# Patient Record
Sex: FEMALE | Race: WHITE | NOT HISPANIC OR LATINO | Employment: OTHER | URBAN - METROPOLITAN AREA
[De-identification: names, ages, dates, MRNs, and addresses within clinical notes are randomized per-mention and may not be internally consistent; named-entity substitution may affect disease eponyms.]

---

## 2017-01-11 ASSESSMENT — MIFFLIN-ST. JEOR: SCORE: 1390.56

## 2017-01-16 ENCOUNTER — SURGERY - HEALTHEAST (OUTPATIENT)
Dept: SURGERY | Facility: CLINIC | Age: 77
End: 2017-01-16

## 2017-01-16 ENCOUNTER — ANESTHESIA - HEALTHEAST (OUTPATIENT)
Dept: SURGERY | Facility: CLINIC | Age: 77
End: 2017-01-16

## 2017-01-17 ENCOUNTER — HOME CARE/HOSPICE - HEALTHEAST (OUTPATIENT)
Dept: HOME HEALTH SERVICES | Facility: HOME HEALTH | Age: 77
End: 2017-01-17

## 2017-01-24 ENCOUNTER — OFFICE VISIT - HEALTHEAST (OUTPATIENT)
Dept: GERIATRICS | Facility: CLINIC | Age: 77
End: 2017-01-24

## 2017-01-24 DIAGNOSIS — M67.912 DYSFUNCTION OF LEFT ROTATOR CUFF: ICD-10-CM

## 2017-01-24 DIAGNOSIS — E11.69 DIABETES MELLITUS TYPE 2 IN OBESE: ICD-10-CM

## 2017-01-24 DIAGNOSIS — L71.9 ACNE ROSACEA: ICD-10-CM

## 2017-01-24 DIAGNOSIS — M54.16 LUMBAR RADICULOPATHY: ICD-10-CM

## 2017-01-24 DIAGNOSIS — Z96.651 STATUS POST RIGHT KNEE REPLACEMENT: ICD-10-CM

## 2017-01-24 DIAGNOSIS — E66.9 DIABETES MELLITUS TYPE 2 IN OBESE: ICD-10-CM

## 2017-01-26 ENCOUNTER — OFFICE VISIT - HEALTHEAST (OUTPATIENT)
Dept: GERIATRICS | Facility: CLINIC | Age: 77
End: 2017-01-26

## 2017-01-26 DIAGNOSIS — E11.69 DIABETES MELLITUS TYPE 2 IN OBESE: ICD-10-CM

## 2017-01-26 DIAGNOSIS — E03.9 HYPOTHYROIDISM: ICD-10-CM

## 2017-01-26 DIAGNOSIS — M54.16 LUMBAR RADICULOPATHY: ICD-10-CM

## 2017-01-26 DIAGNOSIS — E66.9 DIABETES MELLITUS TYPE 2 IN OBESE: ICD-10-CM

## 2017-01-26 DIAGNOSIS — Z96.651 STATUS POST RIGHT KNEE REPLACEMENT: ICD-10-CM

## 2017-01-27 ENCOUNTER — AMBULATORY - HEALTHEAST (OUTPATIENT)
Dept: GERIATRICS | Facility: CLINIC | Age: 77
End: 2017-01-27

## 2021-05-30 VITALS — WEIGHT: 202 LBS | BODY MASS INDEX: 33.72 KG/M2

## 2021-05-30 VITALS — HEIGHT: 65 IN | BODY MASS INDEX: 33.66 KG/M2 | WEIGHT: 202 LBS

## 2021-06-08 NOTE — ANESTHESIA CARE TRANSFER NOTE
Last vitals:   Vitals:    01/16/17 1231   BP: 157/81   Pulse: 80   Resp: 10   Temp: 35.9  C (96.6  F)   SpO2: 100%     Patient's level of consciousness is drowsy, not awake, difficult ot arouse to name called.  Spontaneous respirations: yes  Maintains airway independently: NO. ETT T-piece 6L O2  Dentition unchanged: yes  Oropharynx: oropharynx clear of all foreign objects except ETT    QCDR Measures:  ASA# 20 - Surgical Safety Checklist: ASA20A - Safety Checks Done  PQRS# 430 - Adult PONV Prevention: 4558F - Pt received => 2 anti-emetic agents (different classes) preop & intraop  ASA# 8 - Peds PONV Prevention: NA - Not pediatric patient, not GA or 2 or more risk factors NOT present  PQRS# 424 - Jessie-op Temp Management: 4559F - At least one body temp DOCUMENTED => 35.5C or 95.9F within required timeframe  PQRS# 426 - PACU Transfer Protocol: - Transfer of care checklist used  ASA# 14 - Acute Post-op Pain: ASA14B - Patient did NOT experience pain >= 7 out of 10    I completed my SBAR handoff to the receiving nurse per policy and procedure.

## 2021-06-08 NOTE — PROGRESS NOTES
[facility]      Code Status:  FULL CODE  Visit Type: Review Of Multiple Medical Conditions     Facility:  [facility] [[id number]]           History of Present Illness: Saima Posadas is a 77 y.o. female who is admitted to TCU  Patient is a very pleasant 77-year-old was electively admitted to the hospital for a right TKA done due to debilitating arthritis  she has underlying history bone on bone arthritis and elected to have this procedure done. Postoperatively she's doing quite well and pain is under control and her current regimen  her past medical history significant for history of chronic low back pain which limits our ability to transfer but she says it is under control along with COPD and diabetes along with breast cancer currently in remission  she has a history of nasal epistaxis in the past  She has been put on aspirin for DVT prophylaxis in an ultrasound of her right lower extremity was negative for any DVT prior to her discharge  Now has swelling around leg and is concerned about that-plans on going home today and doing well in PT  Doing 2stairs; SLUMS 28/30; Tinetti 14/28 and walking well and hoping to go home soon    Past Medical History   Diagnosis Date     Allergic rhinitis      Cancer      breast     COPD (chronic obstructive pulmonary disease)      Diabetes mellitus      Disease of thyroid gland      DJD (degenerative joint disease)      Esophageal reflux      Frequent falls      Low back pain      Rosacea      Past Surgical History   Procedure Laterality Date     Septoplasty       Uvulectomy       Dilation and curettage of uterus       Mastectomy Left      Breast cyst incision and drainage       Tubal ligation       Carpal tunnel release       Pr total knee arthroplasty Right 1/16/2017     Procedure:  RIGHT TOTAL KNEE  ARTHROPLASTY;  Surgeon: Daniel Mckinney MD;  Location: [facility] OR;  Service: Orthopedics     No family history on file.  Social History      Social History     Marital status:      Spouse name: N/A     Number of children: N/A     Years of education: N/A     Occupational History     Not on file.     Social History Main Topics     Smoking status: Former Smoker     Quit date: 2005     Smokeless tobacco: Not on file     Alcohol use 0.6 oz/week     1 Glasses of wine per week      Comment: daily     Drug use: No     Sexual activity: Not on file     Other Topics Concern     Not on file     Social History Narrative     Current Outpatient Prescriptions   Medication Sig Dispense Refill     acetaminophen (TYLENOL) 500 MG tablet Take 2 tablets (1,000 mg total) by mouth 3 (three) times a day as needed for pain. 30 tablet 0     aspirin 325 MG tablet Take 1 tablet (325 mg total) by mouth 2 (two) times a day with meals. 90 tablet 0     cholecalciferol, vitamin D3, (VITAMIN D3) 2,000 unit cap Take 1 capsule by mouth daily.       diphenhydrAMINE-acetaminophen (TYLENOL PM EXTRA STRENGTH)  mg Tab Take 1 tablet by mouth bedtime as needed.       docoshexanoic acid-eicosapent 500 mg (FISH OIL) 500-100 mg cap capsule Take 1,000 mg by mouth daily.       docusate sodium (COLACE) 100 MG capsule Take 1 capsule (100 mg total) by mouth 2 (two) times a day as needed for constipation. 20 capsule 0     doxycycline (VIBRAMYCIN) 100 MG capsule Take 100 mg by mouth daily.        fexofenadine (ALLEGRA) 180 MG tablet Take 180 mg by mouth daily.       fluticasone (FLONASE) 50 mcg/actuation nasal spray 2 sprays into each nostril 2 (two) times a day.       levothyroxine (SYNTHROID, LEVOTHROID) 125 MCG tablet Take 125 mcg by mouth daily.       metFORMIN (GLUCOPHAGE-XR) 500 MG 24 hr tablet Take 1,000 mg by mouth 2 (two) times a day with meals.       oxybutynin (DITROPAN XL) 10 MG ER tablet Take 10 mg by mouth daily as needed.        oxyCODONE (ROXICODONE) 5 MG immediate release tablet Take 1 tablet (5 mg total) by mouth every 4 (four) hours. 60 tablet 0     No current  facility-administered medications for this visit.          Review of Systems:    Constitutional: Negative.  Negative for fever, chills, has activity change, appetite change and fatigue.   HENT: Negative for congestion and facial swelling.    Eyes: Negative for photophobia, redness and visual disturbance.   Respiratory: Negative for cough and chest tightness.    Cardiovascular: Negative for chest pain, palpitations and leg swelling.   Gastrointestinal: Negative for nausea, diarrhea, constipation, blood in stool and abdominal distention.   Genitourinary: Negative.    Musculoskeletal: Negative. Pain rt knee   Skin: Negative.    Neurological: Negative for dizziness, tremors, syncope, weakness, light-headedness and headaches.   Hematological: Does not bruise/bleed easily.   Psychiatric/Behavioral: Negative.      Vitals:    01/26/17 1458   BP: 138/72   Pulse: 78   Resp: 18   Temp: 98  F (36.7  C)       Physical Exam:    GENERAL: no acute distress. Cooperative in conversation.   HEENT: pupils are equal, round and reactive. Oral mucosa is moist and intact.  RESP:Chest symmetric. Regular respiratory rate. No stridor.  CVS: S1S2  ABD: Nondistended, soft.  EXTREMITIES: 1+ rt lower extremity edema. Her incision is intact and that is significant effusion around the right knee with no erythema  NEURO: non focal. Alert and oriented x3.   PSYCH: within normal limits. No depression or anxiety.  SKIN: warm dry intact     Labs:    Xr Knee Right 1 Or 2 Vws Portable  Result Date: 1/16/2017  XR KNEE RIGHT 1 OR 2 VWS PORTABLE 1/16/2017 1:01 PM INDICATION: eval prosthesis COMPARISON: None. FINDINGS: Postoperative changes of arthroplasty. Components are well seated and normally aligned. No evidence of a complication.    Us Venous Leg Right-Result Date: 1/19/2017  CONCLUSION: 1.  Right leg veins are negative for DVT.    Assessment/Plan:    Osteoarthritis of the right knee, S/P TKA, -  She's actually doing quite well; her pain is controlled  and she's ambulating well with a walker  she has a follow-up schedule with orthopedics  no new concerns her CPM was refuses she's already up to 88  with flexion  Advised option of trying low dose diretic vs rpt U/S of leg and pt wants to wait and watch and will call Ortho is swelling is any worse  Diabetes mellitus type 2-Stable with no Accu checks ordered in the TCU  - Metformin resumed ON DC TO TCU; doing well  Acne rosacea  - Continue doxycycline  Rotator cuff injury left shoulder, limited range of motion-no new concerns  COPD-cont with her meds  History of breast cancer December 2005-  Lumbar radiculopathy, bilateral  - No further imaging indicated at this time  - Continue conservative therapy  Acute blood loss anemia-no BT given in the hospital and pt is asymtomatic  Swelling right lower extremity  - Ultrasound negative for DVT; on asa BId For DVT prophylaxis  ABLA- dc hg 11.3 and no BT needed  Debilitation she's actually good doing quite well ambulating using a four-wheel walker managing to stairs tinetti is 14 and slums is 28/30  History of epistaxis- at her request and refusal will discontinue her vitamin D fish oil and nasal spray  she can's discuss this with a primary care physician when it is safe to resume  Monitor leg swelling and advised close ortho f/u and elevation and icing-does not want to try diuretic with he rh/o of urge incontinence  And limited mobility..   Total time spent was 35 minutes, more than half of it was in face-to-face counseling regarding disease state, treatment, side effects, documentation, review of clinical data and coordination of care.she is doing well and hoping to go home this week.    Electronically signed by: FABRICIO Galeana  This progress note was completed using Dragon software and there may be grammatical errors.

## 2021-06-08 NOTE — ANESTHESIA PROCEDURE NOTES
Peripheral Block    Patient location during procedure: pre-op  Start time: 1/16/2017 9:48 AM  End time: 1/16/2017 9:52 AM  post-op analgesia per surgeon order as noted in medical record  Staffing:  Performing  Anesthesiologist: ROLAND MORAN  Preanesthetic Checklist  Completed: patient identified, site marked, risks, benefits, and alternatives discussed, timeout performed, consent obtained, airway assessed, oxygen available, suction available, emergency drugs available and hand hygiene performed  Peripheral Block  Block type: other (Selective tibial nerve block), tibial  Prep: ChloraPrep  Patient position: supine  Patient monitoring: cardiac monitor, continuous pulse oximetry, heart rate and blood pressure  Laterality: right  Injection technique: ultrasound guided    Ultrasound used to visualize needle placement in proximity to nerve being blocked: yes   Permanent ultrasound image captured for medical record    Needle  Needle type: Stimuplex   Needle gauge: 20G  Needle length: 6 in  no peripheral nerve catheter placed  Assessment  Injection assessment: negative aspiration for heme, no paresthesia on injection, incremental injection and no difficulty with injection  Additional Notes  10ml 0.25% bupivacaine w/ epi 1:200K injected for nerve block.

## 2021-06-08 NOTE — ANESTHESIA POSTPROCEDURE EVALUATION
Patient: Saima Posadas   RIGHT TOTAL KNEE  ARTHROPLASTY  Anesthesia type: general    Patient location: PACU  Last vitals:   Vitals:    01/16/17 1310   BP: 135/62   Pulse: 79   Resp: 11   Temp:    SpO2: 100%     Post vital signs: stable  Level of consciousness: awake and responds to simple questions  Post-anesthesia pain: pain controlled  Post-anesthesia nausea and vomiting: no  Pulmonary: unassisted, return to baseline  Cardiovascular: stable and blood pressure at baseline  Hydration: adequate  Anesthetic events: no    QCDR Measures:  ASA# 11 - Jessie-op Cardiac Arrest: ASA11B - Patient did NOT experience unanticipated cardiac arrest  ASA# 12 - Jessie-op Mortality Rate: ASA12B - Patient did NOT die  ASA# 13 - PACU Re-Intubation Rate: ASA13B - Patient did NOT require a new airway mgmt  ASA# 10 - Composite Anes Safety: ASA10A - No serious adverse event  ASA# 38 - New Corneal Injury: ASA38A - No new exposure keratitis or corneal abrasion in PACU    Additional Notes:

## 2021-06-08 NOTE — ANESTHESIA PROCEDURE NOTES
Spinal Block    Patient location during procedure: OR  Start time: 1/16/2017 10:10 AM  End time: 1/16/2017 10:13 AM  Reason for block: primary anesthetic    Staffing:  Performing  Anesthesiologist: ROLAND MORAN    Preanesthetic Checklist  Completed: patient identified, risks, benefits, and alternatives discussed, timeout performed, consent obtained, airway assessed, oxygen available, suction available, emergency drugs available and hand hygiene performed  Spinal Block  Patient position: sitting  Prep: ChloraPrep  Patient monitoring: heart rate, cardiac monitor, continuous pulse ox and blood pressure  Approach: midline  Location: L3-4  Injection technique: single-shot  Needle type: pencil-tip   Needle gauge: 24 G

## 2021-06-08 NOTE — ANESTHESIA PREPROCEDURE EVALUATION
Anesthesia Evaluation      Patient summary reviewed   No history of anesthetic complications     Airway   Mallampati: II  Neck ROM: full   Pulmonary - negative ROS and normal exam   (+) COPD moderate,   (-) sleep apnea                         Cardiovascular - negative ROS and normal exam   Neuro/Psych - negative ROS     Endo/Other - negative ROS   (+) diabetes mellitus well controlled, hypothyroidism, obesity,      GI/Hepatic/Renal - negative ROS   (+) GERD well controlled,             Dental - normal exam                        Anesthesia Plan  Planned anesthetic: peripheral nerve block and general endotracheal  ACB + STNB for POA as ordered by surgeon    ASA 3     Anesthetic plan and risks discussed with: patient    Post-op plan: routine recovery

## 2021-06-08 NOTE — PROGRESS NOTES
[facility]      Code Status:  FULL CODE  Visit Type: H & P     Facility:  [facility] [[id number]]           History of Present Illness: Saima Posadas is a 77 y.o. female who is admitted to TCU  Patient is a very pleasant 77-year-old was electively admitted to the hospital for a right TKA done due to debilitating arthritis  she has underlying history bone on bone arthritis and elected to have this procedure done. Postoperatively she's doing quite well and pain is under control and her current regimen  her past medical history significant for history of chronic low back pain which limits our ability to transfer but she says it is under control along with COPD and diabetes along with breast cancer currently in remission  she has a history of nasal epistaxis in the past  She has been put on aspirin for DVT prophylaxis in an ultrasound of her right lower extremity was negative for any DVT prior to her discharge  Pt has been refusing a lot of her medications -Specifically she has been refusing her fish oil; vitamin D as well as nasal spray as she was told that it would lower her risk for epistaxis  Doing 2stairs; SLUMS 28/30; Tinetti 14/28 and walking well and hoping to go home soon    Past Medical History   Diagnosis Date     Allergic rhinitis      Cancer      breast     COPD (chronic obstructive pulmonary disease)      Diabetes mellitus      Disease of thyroid gland      DJD (degenerative joint disease)      Esophageal reflux      Frequent falls      Low back pain      Rosacea      Past Surgical History   Procedure Laterality Date     Septoplasty       Uvulectomy       Dilation and curettage of uterus       Mastectomy Left      Breast cyst incision and drainage       Tubal ligation       Carpal tunnel release       Pr total knee arthroplasty Right 1/16/2017     Procedure:  RIGHT TOTAL KNEE  ARTHROPLASTY;  Surgeon: Daniel Mckinney MD;  Location: [facility];  Service:  Orthopedics     No family history on file.  Social History     Social History     Marital status:      Spouse name: N/A     Number of children: N/A     Years of education: N/A     Occupational History     Not on file.     Social History Main Topics     Smoking status: Former Smoker     Quit date: 2005     Smokeless tobacco: Not on file     Alcohol use 0.6 oz/week     1 Glasses of wine per week      Comment: daily     Drug use: No     Sexual activity: Not on file     Other Topics Concern     Not on file     Social History Narrative     Current Outpatient Prescriptions   Medication Sig Dispense Refill     acetaminophen (TYLENOL) 500 MG tablet Take 2 tablets (1,000 mg total) by mouth 3 (three) times a day as needed for pain. 30 tablet 0     aspirin 325 MG tablet Take 1 tablet (325 mg total) by mouth 2 (two) times a day with meals. 90 tablet 0     cholecalciferol, vitamin D3, (VITAMIN D3) 2,000 unit cap Take 1 capsule by mouth daily.       diphenhydrAMINE-acetaminophen (TYLENOL PM EXTRA STRENGTH)  mg Tab Take 1 tablet by mouth bedtime as needed.       docoshexanoic acid-eicosapent 500 mg (FISH OIL) 500-100 mg cap capsule Take 1,000 mg by mouth daily.       docusate sodium (COLACE) 100 MG capsule Take 1 capsule (100 mg total) by mouth 2 (two) times a day as needed for constipation. 20 capsule 0     doxycycline (VIBRAMYCIN) 100 MG capsule Take 100 mg by mouth daily.        fexofenadine (ALLEGRA) 180 MG tablet Take 180 mg by mouth daily.       fluticasone (FLONASE) 50 mcg/actuation nasal spray 2 sprays into each nostril 2 (two) times a day.       levothyroxine (SYNTHROID, LEVOTHROID) 125 MCG tablet Take 125 mcg by mouth daily.       metFORMIN (GLUCOPHAGE-XR) 500 MG 24 hr tablet Take 1,000 mg by mouth 2 (two) times a day with meals.       oxybutynin (DITROPAN XL) 10 MG ER tablet Take 10 mg by mouth daily as needed.        oxyCODONE (ROXICODONE) 5 MG immediate release tablet Take 1 tablet (5 mg total) by mouth  every 4 (four) hours. 60 tablet 0     No current facility-administered medications for this visit.      Allergies   Allergen Reactions     Aspirin      Nose bleeds         Review of Systems:    Constitutional: Negative.  Negative for fever, chills, has activity change, appetite change and fatigue.   HENT: Negative for congestion and facial swelling.    Eyes: Negative for photophobia, redness and visual disturbance.   Respiratory: Negative for cough and chest tightness.    Cardiovascular: Negative for chest pain, palpitations and leg swelling.   Gastrointestinal: Negative for nausea, diarrhea, constipation, blood in stool and abdominal distention.   Genitourinary: Negative.    Musculoskeletal: Negative. Pain rt knee   Skin: Negative.    Neurological: Negative for dizziness, tremors, syncope, weakness, light-headedness and headaches.   Hematological: Does not bruise/bleed easily.   Psychiatric/Behavioral: Negative.      Vitals:    01/24/17 1027   BP: 130/63   Pulse: 76   Temp: 97  F (36.1  C)       Physical Exam:    GENERAL: no acute distress. Cooperative in conversation.   HEENT: pupils are equal, round and reactive. Oral mucosa is moist and intact.  RESP:Chest symmetric. Regular respiratory rate. No stridor.  CVS: S1S2  ABD: Nondistended, soft.  EXTREMITIES: 1+ rt lower extremity edema. Her incision is intact and that is significant effusion around the right knee with no erythema  NEURO: non focal. Alert and oriented x3.   PSYCH: within normal limits. No depression or anxiety.  SKIN: warm dry intact     Labs:    Recent Results (from the past 240 hour(s))   POCT Glucose   Result Value Ref Range    Glucose,  mg/dL   INR (if patient on warfarin)   Result Value Ref Range    INR 0.95 0.90 - 1.10   POCT Glucose   Result Value Ref Range    Glucose,  mg/dL   POCT Glucose   Result Value Ref Range    Glucose,  mg/dL   POCT Glucose   Result Value Ref Range    Glucose,  mg/dL   Hemoglobin - Daily x 2    Result Value Ref Range    Hemoglobin 12.2 12.0 - 16.0 g/dL   Basic Metabolic Panel   Result Value Ref Range    Sodium 140 136 - 145 mmol/L    Potassium 4.1 3.5 - 5.0 mmol/L    Chloride 103 98 - 107 mmol/L    CO2 30 22 - 31 mmol/L    Anion Gap, Calculation 7 5 - 18 mmol/L    Glucose 105 70 - 125 mg/dL    Calcium 8.9 8.5 - 10.5 mg/dL    BUN 8 8 - 28 mg/dL    Creatinine 0.56 (L) 0.60 - 1.10 mg/dL    GFR MDRD Af Amer >60 >60 mL/min/1.73m2    GFR MDRD Non Af Amer >60 >60 mL/min/1.73m2   POCT Glucose   Result Value Ref Range    Glucose,  mg/dL   POCT Glucose   Result Value Ref Range    Glucose,  mg/dL   POCT Glucose   Result Value Ref Range    Glucose,  mg/dL   POCT Glucose   Result Value Ref Range    Glucose,  mg/dL   Hemoglobin - Daily x 2   Result Value Ref Range    Hemoglobin 11.3 (L) 12.0 - 16.0 g/dL   POCT Glucose   Result Value Ref Range    Glucose,  mg/dL   POCT Glucose   Result Value Ref Range    Glucose,  mg/dL   POCT Glucose   Result Value Ref Range    Glucose,  mg/dL   POCT Glucose   Result Value Ref Range    Glucose,  mg/dL   POCT Glucose   Result Value Ref Range    Glucose,  mg/dL   POCT Glucose   Result Value Ref Range    Glucose, POC 87 mg/dL     Xr Knee Right 1 Or 2 Vws Portable    Result Date: 1/16/2017  XR KNEE RIGHT 1 OR 2 VWS PORTABLE 1/16/2017 1:01 PM INDICATION: eval prosthesis COMPARISON: None. FINDINGS: Postoperative changes of arthroplasty. Components are well seated and normally aligned. No evidence of a complication.    Us Venous Leg Right    Result Date: 1/19/2017  US LWR EXT VENOUS DUPLEX RT 1/19/2017 9:04 AM INDICATION: Calf swelling and pain, postoperative. TECHNIQUE: Routine exam without and with compression, augmentation, and duplex utilizing 2D gray-scale imaging, Doppler interrogation with color-flow and spectral waveform analysis. COMPARISON: None. FINDINGS: The common femoral, femoral, popliteal, and segmentally  visualized calf veins were evaluated. The opposite CFV was also included in the evaluation. Right leg veins are negative for deep venous thrombosis. No popliteal cysts.     CONCLUSION: 1.  Right leg veins are negative for DVT.    Assessment/Plan:    Osteoarthritis of the right knee, S/P TKA, -  She's actually doing quite well; her pain is controlled and she's ambulating well with a walker  she has a follow-up schedule with orthopedics  no new concerns her CPM was refuses she's already up to 88  with flexion  Diabetes mellitus type 2-Stable with no Accu checks ordered in the TCU  - Metformin resumed ON DC TO TCU; doing well  Acne rosacea  - Continue doxycycline  Rotator cuff injury left shoulder, limited range of motion-no new concerns  COPD-cont with her meds  History of breast cancer December 2005-  Lumbar radiculopathy, bilateral  - No further imaging indicated at this time  - Continue conservative therapy  Acute blood loss anemia-no BT given in the hospital and pt is asymtomatic  Swelling right lower extremity  - Ultrasound negative for DVT; on asa BId For DVT prophylaxis  ABLA- dc hg 11.3 and no BT needed  Debilitation she's actually good doing quite well ambulating using a four-wheel walker managing to stairs tinetti is 14 and slums is 28/30  History of epistaxis- at her request and refusal will discontinue her vitamin D fish oil and nasal spray  she can's discuss this with a primary care physician when it is safe to resume  Total time spent was 45 minutes, more than half of it was in face-to-face counseling regarding disease state, treatment, side effects, documentation, review of clinical data and coordination of care.she is doing well and hoping to go home this week.    Electronically signed by: FABRICIO Galeana  This progress note was completed using Dragon software and there may be grammatical errors.

## 2021-06-08 NOTE — ANESTHESIA PROCEDURE NOTES
Peripheral Block    Patient location during procedure: pre-op  Start time: 1/16/2017 9:45 AM  End time: 1/16/2017 9:49 AM  post-op analgesia per surgeon order as noted in medical record  Staffing:  Performing  Anesthesiologist: ROLAND MORAN  Preanesthetic Checklist  Completed: patient identified, site marked, risks, benefits, and alternatives discussed, timeout performed, consent obtained, airway assessed, oxygen available, suction available, emergency drugs available and hand hygiene performed  Peripheral Block  Block type: saphenous, adductor canal block  Prep: ChloraPrep  Patient position: supine  Patient monitoring: blood pressure, heart rate, continuous pulse oximetry and cardiac monitor  Laterality: right  Injection technique: ultrasound guided    Ultrasound used to visualize needle placement in proximity to nerve being blocked: yes   Permanent ultrasound image captured for medical record    Needle  Needle type: Stimuplex   Needle gauge: 20G  Needle length: 6 in  no peripheral nerve catheter placed  Assessment  Injection assessment: negative aspiration for heme, no paresthesia on injection, incremental injection and no difficulty with injection  Additional Notes  15ml 0.5% bupivacaine w/ epi 1:200K injected for nerve block.

## 2021-06-15 PROBLEM — Z96.651 STATUS POST RIGHT KNEE REPLACEMENT: Status: ACTIVE | Noted: 2017-01-16

## 2022-11-08 ENCOUNTER — TRANSFERRED RECORDS (OUTPATIENT)
Dept: HEALTH INFORMATION MANAGEMENT | Facility: CLINIC | Age: 82
End: 2022-11-08

## 2023-05-03 ENCOUNTER — HOSPITAL ENCOUNTER (INPATIENT)
Facility: CLINIC | Age: 83
Setting detail: SURGERY ADMIT
End: 2023-05-03
Attending: ORTHOPAEDIC SURGERY | Admitting: ORTHOPAEDIC SURGERY
Payer: COMMERCIAL

## 2023-05-22 NOTE — H&P (VIEW-ONLY)
[facility]      Preoperative Consultation   Saima Posadas   : 1940   Gender: female    Date of Encounter: 2023    Nursing Notes:   Elizabeth Mon  2023 11:00 AM  Signed  Saima Posadas is a 83 y.o. female (1940) who presents for preop evaluation undergoing back surgery.    Date of Surgery: 23  Surgical Specialty: Orthopedic/Spine  Poncho Hui MD - [facility]  Hospital/Surgical Facility: [facility]  Fax number: [phone] and [phone]  Surgery type: inpatient  Primary Physician: Saida Dow LPN ....................  2023  10:32 AM     Elizabeth Mon  2023 11:00 AM  Signed  Chief Complaint   Patient presents with     Pre-Op Exam       Additional visit information (chief complaint/health maintenance) shared by patient:     There are no preventive care reminders to display for this patient.    Health maintenance reviewed with patient No    Patient presents for an in-person office visit: alone  Communication Method: Patient is not active on [employer] and is aware they will receive their results/communications via phone call  If a phone call is needed, the preferred number is:  Mobile   Home Phone [phone]   Mobile [phone]     May we leave a detailed message at this number? Yes    Is patient in need of refills in the next 3 months? Yes, refills are pended for provider      Elizabeth Mon LPN ....................  2023  10:42 AM          History of Present Illness   Planning back surgery due to spinal stenosis that has failed conservative management.      Spinal stenosis:  Worsening pain, sciatica keeping her up at night.  Patient is not sure what they are doing, by her description sounds like a fusion of L3-4.  Is taking gabapentin only at bedtime, otherwise taking tylenol 500mg 3 pills total daily.      Diabetes:  a1c 7.4, a1c 7.5 22.  Goal under 8 considering age. Stopped  "metformin previously due to GI upset; however A1C increased significantly.  Is tolerating lower dose metformin.  She takes gabapentin 300mg TID for neuropathy as well as chronic pain.  Home sugars running 160's fasting.      Meds: metformin 500mg BID, januvia 100mg daily  Renal:  No ACE/ARB. microalb negative 5/16/23   CV: no statin.  Baseline LDL 89  Eye:  5/2022 due   Complications:  Neuropathy, retinopathy    COPD: uses combivent inhaler prn.  Last use was \"months\" ago.      Hypothyroidism:  tsh at goal on labs 5/16/23.      Recently treated for vaginal yeast infection with diflucan which was effective.      Not able to walk 4 blocks due to back pain, is not limited by shortness of breath.  No chest  pain.  Does note small amount of swelling in legs at the end of the day that is chronic.  Resolves overnight or with elevation.           Review of Systems   A comprehensive review of systems was negative except for items noted in HPI.    Patient Active Problem List   Diagnosis Code     Acquired hypothyroidism E03.9     Rosacea L71.9     Allergic rhinitis, cause unspecified J30.9     Unspecified vitamin D deficiency E55.9     Obesity (BMI 30-39.9) E66.9     COPD mixed type (HC) J44.9     Osteoarthritis of right knee M17.11     Vitamin B12 deficiency E53.8     Type 2 diabetes mellitus with diabetic polyneuropathy, without long-term current use of insulin (HC) E11.42     Primary osteoarthritis of left knee M17.12     Overactive bladder N32.81     Personal history of breast cancer Z85.3     Mild nonproliferative diabetic retinopathy of both eyes without macular edema associated with type 2 diabetes mellitus (HC) E11.3293     Spinal stenosis of lumbar region with neurogenic claudication M48.062     Current Outpatient Medications   Medication Sig     acetaminophen (TYLENOL EXTRA STRGTH) 500 mg tablet Take 1 Tablet (500 mg) by mouth two times daily. Max acetaminophen dose: 4000mg in 24 hrs.     blood sugar diagnostic " (Accu-Chek Guide test strips) strip As directed once daily. Dispense item covered by pt ins. E11.9 NIDDM type II - Test 1 time/day     cholecalciferol (VITAMIN D3) 2,000 unit capsule Take  by mouth once daily.     clindamycin 1% (CLEOCIN-T) 1 % lotion clindamycin 1 % lotion   KATHRYN TO ABDOMEN D     CYANOCOBALAMIN, VITAMIN B-12, (VITAMIN B-12 ORAL) Take  by mouth.     fluticasone propionate (CUTIVATE) 0.05 % cream      gabapentin (NEURONTIN) 300 mg capsule Take 1 Capsule (300 mg) by mouth three times daily.     ipratropium-albuteroL (combivent respimat) ( mcg each actuation) mist inhaler INHALE ONE PUFF BY MOUTH FOUR TIMES A DAY     ketoconazole 2% topical (NIZORAL) cream APPLY TOPICALLY TWICE DAILY TO RASH     lancets (ACCU-CHEK FASTCLIX) Test 1 times per day.     levothyroxine (SYNTHROID) 125 mcg tablet Take 1 Tablet (125 mcg) by mouth before breakfast.     medication order composer Aler-david ([employer] brand allergy pill)     metFORMIN (GLUCOPHAGE XR) 500 mg Extended-Release tablet Take 1 Tablet (500 mg) by mouth two times daily with meals.     omeprazole 20 mg tablet Take 1 Tablet (20 mg) by mouth once daily.     SITagliptin phosphate (Januvia) 100 mg tablet Take 1 Tablet (100 mg) by mouth once daily.     solifenacin (VESICARE) 10 mg tablet Take 10 mg by mouth once daily.     No current facility-administered medications for this visit.     Medications have been reviewed by me and are current to the best of my knowledge and ability.     Allergies   Allergen Reactions     Aspirin Other - Describe In Comment Field     epistaxis     Combivent [Ipratropium-Albuterol] *Unknown     Past Surgical History:   . Laterality Date     BREAST BIOPSY      Breast Biopsy     Breast mastectomy  12/20/2005    Left side     CARPAL TUNNEL RELEASE      right     CATARACT REMOVAL      bilateral     COLONOSCOPY DIAGNOSTIC  12/05    Colonoscopy with diverticulosis     COLONOSCOPY SCREENING  12/2010    diverticulosis     COLPOSCOPY       "Colposcopy     CYST INCISION AND DRAINAGE      Breast Cyst Aspiration     DILATION AND CURETTAGE       FLEXIBLE SIGMOIDOSCOPY       gangion cyst removal      left hand     IMO DENTAL SURGERY PROCEDURE      Dental Procedure     MODIFIED RADICAL MASTECTOMY      Mastectomy, Mod. Radical     NASOPHARYNGOSCOPY             x 6     RI UNLISTED PROCEDURE BREAST      Breast Procedure     SEPTOPLASTY      snoring     SEPTOPLASTY      Septoplasty     SKIN/SUBCUTANEOUS SURGERY      Dermatologic Procedure - skin cancer nose     TUBAL LIGATION      Tubal Ligation/Transection     uvulectomy      snoring     VAGINAL DELIVERY      Vaginal Delivery     VITRECTOMY, endolaser, c3f8 14% Right 2017    Dr. Madrigal     Social History     Tobacco Use     Smoking status: Former     Current packs/day: 0.00     Average packs/day: 1 pack/day for 30.0 years (30.0 pk-yrs)     Types: Cigarettes     Start date: 1975     Quit date: 2005     Years since quittin.4     Smokeless tobacco: Never   Vaping Use     Vaping Use: Never used   Substance Use Topics     Alcohol use: Yes     Alcohol/week: 30.0 standard drinks of alcohol     Comment: 1 glass of wine per day     Drug use: No     Family History   Problem Relation Age of Onset     Heart Disease Father         CAD     Cancer Mother         ovarian     Diabetes Mother      Cancer-ovarian Mother      Cancer-breast Sister               Cancer Sister      Stroke Other         aunt     Alcohol/Drug Brother      Cancer Maternal Grandmother      Cancer Maternal Grandfather      Cancer-breast Maternal Aunt      Cancer Paternal Uncle      Diabetes Paternal Grandfather      Diabetes Paternal Aunt      Cancer-prostate No Family History      Cancer-colon No Family History        PAST DIFFICULTY WITH ANESTHESIA: None     Physical Exam   /60 (Cuff Site: Right Arm, Position: Sitting, Cuff Size: Adult Large)   Pulse 80   Resp 16   Ht 1.575 m (5' 2\")   Wt 90.7 " kg (200 lb)   BMI 36.58 kg/m   Body mass index is 36.58 kg/m .  Gen:  Alert, in NAD  HEENT: NC/AT.  Perrl, eomi. Canals and TM's clear bilaterally.  Pharynx pink and moist  Neck:  supple, no lymphadenopathy.  Thyroid without masses.    Heart: regular rate and rhythm, no murmurs, gallops or rubs.    Lungs:  Clear to auscultation bilaterally, no wheezing or ronchi  Abdomen:  BS+, soft, nontender, non distended.  No hepatosplenomegaly or masses noted as limited by body habitus.    Skin:  no rashes, abnormal moles or skin lesions noted on visible skin.    Extrem:  no edema noted.  No deformities, normal ROM of extremities to gross examination.  Able to get on/off exam table without assistance.    Diabetic foot exam:   Left and right foot:1+ pedal pulses, no lesions, nail hygiene good. Monofilament exam with normal sensation over  5th toe and medial and lateral heel.  no sensation bilateral great toes and MTP joint of great toes bilaterally.    Psych:  alert and oriented x 3.  Affect appropriate to conversation with good eye contact.         Assessment / Plan         The Pre-Op Tool    Recommendations      Intermediate Risk Procedure    Risk of CV Complication (RCRI)  0.5%    Current Cardiac Status  Poor exertional capacity ( < 4 mets )    Cardiac History  No history of coronary artery disease           Labs  HGB within last 30 days  EKG  Baseline EKG within the last 12 months  CXR  Not indicated    Stress Testing  Not indicated    * Testing recommendations are intended to assist, but not direct, clinical decisions.           Type & Screen should be obtained by Anesthesia only if the risk of transfusion is > 5% for the procedure         Do not take your oral diabetes medication(s) on the day of the procedure.  Take your other medications as usual prior to the procedure  Hold vitamins and/or supplements for 1 week prior to the procedure  Hold fish oil for 2 weeks prior to the procedure  Okay to take Acetaminophen  (Tylenol) up until the procedure  Hold / avoid NSAIDs (e.g. ibuprofen, naproxen) prior to procedure: 2 days for ibuprofen (Advil) and 4 days for naproxen (Aleve).    * Medication recommendations are not intended to be exhaustive; they are limited to common medications that are potentially dangerous if incorrectly managed          Labs  * Data supports elimination of  routine  laboratory testing in favor of focused,  indicated  testing based on medical co-morbidities. A 2009 study randomized 1061 patients undergoing ambulatory, non-cataract surgery to routine or to indicated testing. Perioperative adverse events were similar (Anesthesia & Analgesia 2009;108:467-75; Anesthesiol. Clin. 2016 Mar;34(1):43-58).  Stress Testing     Session ID: [id number]  Endnotes and bibliography available upon request: [email or web address]    Labs: pending hgb.    ECG: NSR rate 73.  No ST-T changes.      ICD-10-CM    1. Pre-op examination  Z01.818 ME READING EKG - NO CHARGE, COMP ONLY     EKG 12 LEAD     ME ECG ROUTINE ECG W/LEAST 12 LDS W/I&R     HEMOGLOBIN      2. Spinal stenosis of lumbar region with neurogenic claudication  M48.062       3. Type 2 diabetes mellitus with diabetic polyneuropathy, without long-term current use of insulin (HC)  E11.42 a1c 7.4 on januvia and metformin.  At goal considering age.  No change to meds     metFORMIN (GLUCOPHAGE XR) 500 mg Extended-Release tablet     SITagliptin phosphate (Januvia) 100 mg tablet      4. Acquired hypothyroidism  E03.9 tsh at goal.      levothyroxine (SYNTHROID) 125 mcg tablet      5. COPD mixed type (HC)  J44.9 ipratropium-albuteroL (combivent respimat) ( mcg each actuation) mist inhaler      6. COVID-19 vaccine administered  Z23 COVID-19 VACCINE MODERNA 50MCG 0.5ML 11YO+ BIVALENT EUA        The patients medical conditions are optimized prior to surgery.  The decision to proceed with surgery is at the discretion of the surgeon and anesthesiologist.      RTC  medicare physical 11/2023 and DM follow up.      Electronically Signed by:   Saida Dow MD    5/23/2023

## 2023-06-05 RX ORDER — VANCOMYCIN HYDROCHLORIDE 1 G/20ML
1 INJECTION, POWDER, LYOPHILIZED, FOR SOLUTION INTRAVENOUS
Status: CANCELLED | OUTPATIENT
Start: 2023-06-05

## 2023-06-05 RX ORDER — CEFAZOLIN SODIUM/WATER 2 G/20 ML
2 SYRINGE (ML) INTRAVENOUS
Status: CANCELLED | OUTPATIENT
Start: 2023-06-05

## 2023-06-05 RX ORDER — GABAPENTIN 100 MG/1
100 CAPSULE ORAL
Status: CANCELLED | OUTPATIENT
Start: 2023-06-05

## 2023-06-05 RX ORDER — CEFAZOLIN SODIUM/WATER 2 G/20 ML
2 SYRINGE (ML) INTRAVENOUS SEE ADMIN INSTRUCTIONS
Status: CANCELLED | OUTPATIENT
Start: 2023-06-05

## 2023-06-14 ENCOUNTER — APPOINTMENT (OUTPATIENT)
Dept: PHYSICAL THERAPY | Facility: CLINIC | Age: 83
DRG: 460 | End: 2023-06-14
Attending: ORTHOPAEDIC SURGERY
Payer: COMMERCIAL

## 2023-06-14 ENCOUNTER — HOSPITAL ENCOUNTER (INPATIENT)
Facility: CLINIC | Age: 83
LOS: 5 days | Discharge: NURSING, REHAB OR LONG-TERM CARE | DRG: 460 | End: 2023-06-19
Attending: ORTHOPAEDIC SURGERY | Admitting: ORTHOPAEDIC SURGERY
Payer: COMMERCIAL

## 2023-06-14 ENCOUNTER — APPOINTMENT (OUTPATIENT)
Dept: RADIOLOGY | Facility: CLINIC | Age: 83
DRG: 460 | End: 2023-06-14
Attending: ORTHOPAEDIC SURGERY
Payer: COMMERCIAL

## 2023-06-14 ENCOUNTER — ANESTHESIA EVENT (OUTPATIENT)
Dept: SURGERY | Facility: CLINIC | Age: 83
DRG: 460 | End: 2023-06-14
Payer: COMMERCIAL

## 2023-06-14 ENCOUNTER — ANESTHESIA (OUTPATIENT)
Dept: SURGERY | Facility: CLINIC | Age: 83
DRG: 460 | End: 2023-06-14
Payer: COMMERCIAL

## 2023-06-14 DIAGNOSIS — M48.062 SPINAL STENOSIS OF LUMBAR REGION WITH NEUROGENIC CLAUDICATION: Primary | ICD-10-CM

## 2023-06-14 PROBLEM — M48.061 LUMBAR SPINAL STENOSIS: Status: ACTIVE | Noted: 2023-06-14

## 2023-06-14 LAB
GLUCOSE BLDC GLUCOMTR-MCNC: 137 MG/DL (ref 70–99)
GLUCOSE BLDC GLUCOMTR-MCNC: 151 MG/DL (ref 70–99)
GLUCOSE BLDC GLUCOMTR-MCNC: 206 MG/DL (ref 70–99)
GLUCOSE BLDC GLUCOMTR-MCNC: 215 MG/DL (ref 70–99)
HBA1C MFR BLD: 7.2 %

## 2023-06-14 PROCEDURE — 250N000009 HC RX 250: Performed by: ORTHOPAEDIC SURGERY

## 2023-06-14 PROCEDURE — 36415 COLL VENOUS BLD VENIPUNCTURE: CPT | Performed by: EMERGENCY MEDICINE

## 2023-06-14 PROCEDURE — 272N000001 HC OR GENERAL SUPPLY STERILE: Performed by: ORTHOPAEDIC SURGERY

## 2023-06-14 PROCEDURE — 120N000001 HC R&B MED SURG/OB

## 2023-06-14 PROCEDURE — 370N000017 HC ANESTHESIA TECHNICAL FEE, PER MIN: Performed by: ORTHOPAEDIC SURGERY

## 2023-06-14 PROCEDURE — 250N000013 HC RX MED GY IP 250 OP 250 PS 637: Performed by: PHYSICIAN ASSISTANT

## 2023-06-14 PROCEDURE — 250N000005 HC OR RX SURGIFLO HEMOSTATIC MATRIX 10ML 199102S OPNP: Performed by: ORTHOPAEDIC SURGERY

## 2023-06-14 PROCEDURE — 97161 PT EVAL LOW COMPLEX 20 MIN: CPT | Mod: GP

## 2023-06-14 PROCEDURE — C1713 ANCHOR/SCREW BN/BN,TIS/BN: HCPCS | Performed by: ORTHOPAEDIC SURGERY

## 2023-06-14 PROCEDURE — 250N000011 HC RX IP 250 OP 636: Performed by: NURSE ANESTHETIST, CERTIFIED REGISTERED

## 2023-06-14 PROCEDURE — 250N000009 HC RX 250: Performed by: NURSE ANESTHETIST, CERTIFIED REGISTERED

## 2023-06-14 PROCEDURE — 99222 1ST HOSP IP/OBS MODERATE 55: CPT | Performed by: EMERGENCY MEDICINE

## 2023-06-14 PROCEDURE — 0SG0071 FUSION OF LUMBAR VERTEBRAL JOINT WITH AUTOLOGOUS TISSUE SUBSTITUTE, POSTERIOR APPROACH, POSTERIOR COLUMN, OPEN APPROACH: ICD-10-PCS | Performed by: ORTHOPAEDIC SURGERY

## 2023-06-14 PROCEDURE — 258N000003 HC RX IP 258 OP 636: Performed by: PHYSICIAN ASSISTANT

## 2023-06-14 PROCEDURE — 999N000141 HC STATISTIC PRE-PROCEDURE NURSING ASSESSMENT: Performed by: ORTHOPAEDIC SURGERY

## 2023-06-14 PROCEDURE — 258N000003 HC RX IP 258 OP 636: Performed by: NURSE ANESTHETIST, CERTIFIED REGISTERED

## 2023-06-14 PROCEDURE — 258N000003 HC RX IP 258 OP 636: Performed by: ANESTHESIOLOGY

## 2023-06-14 PROCEDURE — 250N000011 HC RX IP 250 OP 636: Performed by: PHYSICIAN ASSISTANT

## 2023-06-14 PROCEDURE — 999N000182 XR SURGERY CARM FLUORO GREATER THAN 5 MIN: Mod: TC

## 2023-06-14 PROCEDURE — 83036 HEMOGLOBIN GLYCOSYLATED A1C: CPT | Performed by: EMERGENCY MEDICINE

## 2023-06-14 PROCEDURE — 250N000012 HC RX MED GY IP 250 OP 636 PS 637: Performed by: EMERGENCY MEDICINE

## 2023-06-14 PROCEDURE — 710N000010 HC RECOVERY PHASE 1, LEVEL 2, PER MIN: Performed by: ORTHOPAEDIC SURGERY

## 2023-06-14 PROCEDURE — 97530 THERAPEUTIC ACTIVITIES: CPT | Mod: GP

## 2023-06-14 PROCEDURE — 250N000011 HC RX IP 250 OP 636: Performed by: ORTHOPAEDIC SURGERY

## 2023-06-14 PROCEDURE — 01NB0ZZ RELEASE LUMBAR NERVE, OPEN APPROACH: ICD-10-PCS | Performed by: ORTHOPAEDIC SURGERY

## 2023-06-14 PROCEDURE — 258N000003 HC RX IP 258 OP 636: Performed by: ORTHOPAEDIC SURGERY

## 2023-06-14 PROCEDURE — 97116 GAIT TRAINING THERAPY: CPT | Mod: GP

## 2023-06-14 PROCEDURE — 360N000077 HC SURGERY LEVEL 4, PER MIN: Performed by: ORTHOPAEDIC SURGERY

## 2023-06-14 PROCEDURE — 250N000013 HC RX MED GY IP 250 OP 250 PS 637: Performed by: EMERGENCY MEDICINE

## 2023-06-14 PROCEDURE — 250N000011 HC RX IP 250 OP 636: Performed by: ANESTHESIOLOGY

## 2023-06-14 DEVICE — IMP SCR SET MEDT TSRH 3DX FLUSH BREAK 8351500: Type: IMPLANTABLE DEVICE | Site: SPINE LUMBAR | Status: FUNCTIONAL

## 2023-06-14 DEVICE — IMP CONNECTOR MEDT TSRH 3DX SMALL 8351520: Type: IMPLANTABLE DEVICE | Site: SPINE LUMBAR | Status: FUNCTIONAL

## 2023-06-14 DEVICE — IMP SCR MEDT TSRH 3DX OG THIN 6.5X50MM TI 83565509: Type: IMPLANTABLE DEVICE | Site: SPINE LUMBAR | Status: FUNCTIONAL

## 2023-06-14 DEVICE — IMP ROD MEDT TSRH PREBENT 03.5CM 8370035: Type: IMPLANTABLE DEVICE | Site: SPINE LUMBAR | Status: FUNCTIONAL

## 2023-06-14 RX ORDER — ONDANSETRON 2 MG/ML
4 INJECTION INTRAMUSCULAR; INTRAVENOUS EVERY 30 MIN PRN
Status: DISCONTINUED | OUTPATIENT
Start: 2023-06-14 | End: 2023-06-14 | Stop reason: HOSPADM

## 2023-06-14 RX ORDER — VANCOMYCIN HYDROCHLORIDE 1 G/20ML
INJECTION, POWDER, LYOPHILIZED, FOR SOLUTION INTRAVENOUS PRN
Status: DISCONTINUED | OUTPATIENT
Start: 2023-06-14 | End: 2023-06-14 | Stop reason: HOSPADM

## 2023-06-14 RX ORDER — FENTANYL CITRATE 50 UG/ML
25 INJECTION, SOLUTION INTRAMUSCULAR; INTRAVENOUS EVERY 5 MIN PRN
Status: DISCONTINUED | OUTPATIENT
Start: 2023-06-14 | End: 2023-06-14 | Stop reason: HOSPADM

## 2023-06-14 RX ORDER — NICOTINE POLACRILEX 4 MG
15-30 LOZENGE BUCCAL
Status: DISCONTINUED | OUTPATIENT
Start: 2023-06-14 | End: 2023-06-19 | Stop reason: HOSPADM

## 2023-06-14 RX ORDER — NALOXONE HYDROCHLORIDE 0.4 MG/ML
0.2 INJECTION, SOLUTION INTRAMUSCULAR; INTRAVENOUS; SUBCUTANEOUS
Status: DISCONTINUED | OUTPATIENT
Start: 2023-06-14 | End: 2023-06-19 | Stop reason: HOSPADM

## 2023-06-14 RX ORDER — NALOXONE HYDROCHLORIDE 0.4 MG/ML
0.4 INJECTION, SOLUTION INTRAMUSCULAR; INTRAVENOUS; SUBCUTANEOUS
Status: DISCONTINUED | OUTPATIENT
Start: 2023-06-14 | End: 2023-06-19 | Stop reason: HOSPADM

## 2023-06-14 RX ORDER — VITAMIN B COMPLEX
50 TABLET ORAL DAILY
Status: DISCONTINUED | OUTPATIENT
Start: 2023-06-15 | End: 2023-06-19 | Stop reason: HOSPADM

## 2023-06-14 RX ORDER — IPRATROPIUM BROMIDE AND ALBUTEROL 20; 100 UG/1; UG/1
1 SPRAY, METERED RESPIRATORY (INHALATION) 2 TIMES DAILY PRN
COMMUNITY

## 2023-06-14 RX ORDER — LIDOCAINE HYDROCHLORIDE 10 MG/ML
INJECTION, SOLUTION INFILTRATION; PERINEURAL PRN
Status: DISCONTINUED | OUTPATIENT
Start: 2023-06-14 | End: 2023-06-14

## 2023-06-14 RX ORDER — DEXAMETHASONE SODIUM PHOSPHATE 10 MG/ML
INJECTION, SOLUTION INTRAMUSCULAR; INTRAVENOUS PRN
Status: DISCONTINUED | OUTPATIENT
Start: 2023-06-14 | End: 2023-06-14

## 2023-06-14 RX ORDER — SODIUM CHLORIDE, SODIUM LACTATE, POTASSIUM CHLORIDE, CALCIUM CHLORIDE 600; 310; 30; 20 MG/100ML; MG/100ML; MG/100ML; MG/100ML
INJECTION, SOLUTION INTRAVENOUS CONTINUOUS
Status: DISCONTINUED | OUTPATIENT
Start: 2023-06-14 | End: 2023-06-14 | Stop reason: HOSPADM

## 2023-06-14 RX ORDER — VANCOMYCIN HYDROCHLORIDE 1 G/20ML
1 INJECTION, POWDER, LYOPHILIZED, FOR SOLUTION INTRAVENOUS
Status: DISCONTINUED | OUTPATIENT
Start: 2023-06-14 | End: 2023-06-14 | Stop reason: HOSPADM

## 2023-06-14 RX ORDER — PANTOPRAZOLE SODIUM 40 MG/1
40 TABLET, DELAYED RELEASE ORAL DAILY
Status: DISCONTINUED | OUTPATIENT
Start: 2023-06-15 | End: 2023-06-19 | Stop reason: HOSPADM

## 2023-06-14 RX ORDER — LEVOTHYROXINE SODIUM 125 UG/1
125 TABLET ORAL DAILY
Status: DISCONTINUED | OUTPATIENT
Start: 2023-06-15 | End: 2023-06-19 | Stop reason: HOSPADM

## 2023-06-14 RX ORDER — TOLTERODINE 2 MG/1
4 CAPSULE, EXTENDED RELEASE ORAL DAILY
Status: DISCONTINUED | OUTPATIENT
Start: 2023-06-15 | End: 2023-06-19 | Stop reason: HOSPADM

## 2023-06-14 RX ORDER — FENTANYL CITRATE 50 UG/ML
50 INJECTION, SOLUTION INTRAMUSCULAR; INTRAVENOUS EVERY 5 MIN PRN
Status: DISCONTINUED | OUTPATIENT
Start: 2023-06-14 | End: 2023-06-14 | Stop reason: HOSPADM

## 2023-06-14 RX ORDER — ACETAMINOPHEN 325 MG/1
975 TABLET ORAL EVERY 8 HOURS
Status: COMPLETED | OUTPATIENT
Start: 2023-06-14 | End: 2023-06-17

## 2023-06-14 RX ORDER — BISACODYL 10 MG
10 SUPPOSITORY, RECTAL RECTAL DAILY PRN
Status: DISCONTINUED | OUTPATIENT
Start: 2023-06-14 | End: 2023-06-19 | Stop reason: HOSPADM

## 2023-06-14 RX ORDER — VANCOMYCIN HYDROCHLORIDE 1 G/20ML
INJECTION, POWDER, LYOPHILIZED, FOR SOLUTION INTRAVENOUS
Status: DISCONTINUED
Start: 2023-06-14 | End: 2023-06-14 | Stop reason: HOSPADM

## 2023-06-14 RX ORDER — GABAPENTIN 300 MG/1
300 CAPSULE ORAL AT BEDTIME
Status: DISCONTINUED | OUTPATIENT
Start: 2023-06-14 | End: 2023-06-18

## 2023-06-14 RX ORDER — DEXTROSE MONOHYDRATE 25 G/50ML
25-50 INJECTION, SOLUTION INTRAVENOUS
Status: DISCONTINUED | OUTPATIENT
Start: 2023-06-14 | End: 2023-06-19 | Stop reason: HOSPADM

## 2023-06-14 RX ORDER — HYDROMORPHONE HCL IN WATER/PF 6 MG/30 ML
0.4 PATIENT CONTROLLED ANALGESIA SYRINGE INTRAVENOUS EVERY 5 MIN PRN
Status: DISCONTINUED | OUTPATIENT
Start: 2023-06-14 | End: 2023-06-14 | Stop reason: HOSPADM

## 2023-06-14 RX ORDER — BUPIVACAINE HYDROCHLORIDE 2.5 MG/ML
INJECTION, SOLUTION INFILTRATION; PERINEURAL
Status: DISCONTINUED
Start: 2023-06-14 | End: 2023-06-14 | Stop reason: HOSPADM

## 2023-06-14 RX ORDER — OXYCODONE HYDROCHLORIDE 5 MG/1
10 TABLET ORAL EVERY 4 HOURS PRN
Status: DISCONTINUED | OUTPATIENT
Start: 2023-06-14 | End: 2023-06-16

## 2023-06-14 RX ORDER — ONDANSETRON 2 MG/ML
INJECTION INTRAMUSCULAR; INTRAVENOUS PRN
Status: DISCONTINUED | OUTPATIENT
Start: 2023-06-14 | End: 2023-06-14

## 2023-06-14 RX ORDER — ONDANSETRON 2 MG/ML
4 INJECTION INTRAMUSCULAR; INTRAVENOUS EVERY 6 HOURS PRN
Status: DISCONTINUED | OUTPATIENT
Start: 2023-06-14 | End: 2023-06-19 | Stop reason: HOSPADM

## 2023-06-14 RX ORDER — AMOXICILLIN 250 MG
1 CAPSULE ORAL 2 TIMES DAILY
Status: DISCONTINUED | OUTPATIENT
Start: 2023-06-14 | End: 2023-06-19 | Stop reason: HOSPADM

## 2023-06-14 RX ORDER — PROPOFOL 10 MG/ML
INJECTION, EMULSION INTRAVENOUS CONTINUOUS PRN
Status: DISCONTINUED | OUTPATIENT
Start: 2023-06-14 | End: 2023-06-14

## 2023-06-14 RX ORDER — FENTANYL CITRATE 50 UG/ML
INJECTION, SOLUTION INTRAMUSCULAR; INTRAVENOUS PRN
Status: DISCONTINUED | OUTPATIENT
Start: 2023-06-14 | End: 2023-06-14

## 2023-06-14 RX ORDER — GABAPENTIN 100 MG/1
100 CAPSULE ORAL AT BEDTIME
Status: DISCONTINUED | OUTPATIENT
Start: 2023-06-14 | End: 2023-06-14 | Stop reason: DRUGHIGH

## 2023-06-14 RX ORDER — POLYETHYLENE GLYCOL 3350 17 G/17G
17 POWDER, FOR SOLUTION ORAL DAILY
Status: DISCONTINUED | OUTPATIENT
Start: 2023-06-15 | End: 2023-06-19 | Stop reason: HOSPADM

## 2023-06-14 RX ORDER — HYDROXYZINE HYDROCHLORIDE 10 MG/1
10 TABLET, FILM COATED ORAL EVERY 6 HOURS PRN
Status: DISCONTINUED | OUTPATIENT
Start: 2023-06-14 | End: 2023-06-16

## 2023-06-14 RX ORDER — HYDROMORPHONE HCL IN WATER/PF 6 MG/30 ML
0.2 PATIENT CONTROLLED ANALGESIA SYRINGE INTRAVENOUS
Status: DISCONTINUED | OUTPATIENT
Start: 2023-06-14 | End: 2023-06-16

## 2023-06-14 RX ORDER — CEFAZOLIN SODIUM 2 G/100ML
2 INJECTION, SOLUTION INTRAVENOUS EVERY 8 HOURS
Status: COMPLETED | OUTPATIENT
Start: 2023-06-14 | End: 2023-06-15

## 2023-06-14 RX ORDER — SOLIFENACIN SUCCINATE 10 MG/1
10 TABLET, FILM COATED ORAL DAILY
COMMUNITY

## 2023-06-14 RX ORDER — PROCHLORPERAZINE MALEATE 5 MG
5 TABLET ORAL EVERY 6 HOURS PRN
Status: DISCONTINUED | OUTPATIENT
Start: 2023-06-14 | End: 2023-06-19 | Stop reason: HOSPADM

## 2023-06-14 RX ORDER — LORATADINE 10 MG/1
10 TABLET ORAL DAILY PRN
Status: DISCONTINUED | OUTPATIENT
Start: 2023-06-14 | End: 2023-06-16

## 2023-06-14 RX ORDER — ACETAMINOPHEN 500 MG
500 TABLET ORAL 2 TIMES DAILY
Status: ON HOLD | COMMUNITY
End: 2023-06-16

## 2023-06-14 RX ORDER — LIDOCAINE 40 MG/G
CREAM TOPICAL
Status: DISCONTINUED | OUTPATIENT
Start: 2023-06-14 | End: 2023-06-14 | Stop reason: HOSPADM

## 2023-06-14 RX ORDER — CEFAZOLIN SODIUM/WATER 2 G/20 ML
2 SYRINGE (ML) INTRAVENOUS SEE ADMIN INSTRUCTIONS
Status: DISCONTINUED | OUTPATIENT
Start: 2023-06-14 | End: 2023-06-14 | Stop reason: HOSPADM

## 2023-06-14 RX ORDER — DEXMEDETOMIDINE HYDROCHLORIDE 4 UG/ML
INJECTION, SOLUTION INTRAVENOUS PRN
Status: DISCONTINUED | OUTPATIENT
Start: 2023-06-14 | End: 2023-06-14

## 2023-06-14 RX ORDER — PROPOFOL 10 MG/ML
INJECTION, EMULSION INTRAVENOUS PRN
Status: DISCONTINUED | OUTPATIENT
Start: 2023-06-14 | End: 2023-06-14

## 2023-06-14 RX ORDER — HYDROMORPHONE HCL IN WATER/PF 6 MG/30 ML
0.4 PATIENT CONTROLLED ANALGESIA SYRINGE INTRAVENOUS
Status: DISCONTINUED | OUTPATIENT
Start: 2023-06-14 | End: 2023-06-16

## 2023-06-14 RX ORDER — FEXOFENADINE HCL 180 MG/1
180 TABLET ORAL DAILY
Status: DISCONTINUED | OUTPATIENT
Start: 2023-06-15 | End: 2023-06-19 | Stop reason: HOSPADM

## 2023-06-14 RX ORDER — ONDANSETRON 4 MG/1
4 TABLET, ORALLY DISINTEGRATING ORAL EVERY 6 HOURS PRN
Status: DISCONTINUED | OUTPATIENT
Start: 2023-06-14 | End: 2023-06-19 | Stop reason: HOSPADM

## 2023-06-14 RX ORDER — ONDANSETRON 4 MG/1
4 TABLET, ORALLY DISINTEGRATING ORAL EVERY 30 MIN PRN
Status: DISCONTINUED | OUTPATIENT
Start: 2023-06-14 | End: 2023-06-14 | Stop reason: HOSPADM

## 2023-06-14 RX ORDER — SODIUM CHLORIDE 9 MG/ML
INJECTION, SOLUTION INTRAVENOUS CONTINUOUS
Status: DISCONTINUED | OUTPATIENT
Start: 2023-06-14 | End: 2023-06-16

## 2023-06-14 RX ORDER — GABAPENTIN 300 MG/1
300 CAPSULE ORAL AT BEDTIME
Status: ON HOLD | COMMUNITY
End: 2023-06-19

## 2023-06-14 RX ORDER — OXYCODONE HYDROCHLORIDE 5 MG/1
5 TABLET ORAL EVERY 4 HOURS PRN
Status: DISCONTINUED | OUTPATIENT
Start: 2023-06-14 | End: 2023-06-16

## 2023-06-14 RX ORDER — ACETAMINOPHEN 325 MG/1
650 TABLET ORAL EVERY 4 HOURS PRN
Status: DISCONTINUED | OUTPATIENT
Start: 2023-06-17 | End: 2023-06-19 | Stop reason: HOSPADM

## 2023-06-14 RX ORDER — CEFAZOLIN SODIUM/WATER 2 G/20 ML
2 SYRINGE (ML) INTRAVENOUS
Status: DISCONTINUED | OUTPATIENT
Start: 2023-06-14 | End: 2023-06-14 | Stop reason: HOSPADM

## 2023-06-14 RX ORDER — GLYCOPYRROLATE 0.2 MG/ML
INJECTION, SOLUTION INTRAMUSCULAR; INTRAVENOUS PRN
Status: DISCONTINUED | OUTPATIENT
Start: 2023-06-14 | End: 2023-06-14

## 2023-06-14 RX ORDER — GABAPENTIN 100 MG/1
100 CAPSULE ORAL
Status: COMPLETED | OUTPATIENT
Start: 2023-06-14 | End: 2023-06-14

## 2023-06-14 RX ORDER — METFORMIN HCL 500 MG
500 TABLET, EXTENDED RELEASE 24 HR ORAL 2 TIMES DAILY WITH MEALS
Status: DISCONTINUED | OUTPATIENT
Start: 2023-06-15 | End: 2023-06-19 | Stop reason: HOSPADM

## 2023-06-14 RX ORDER — HYDROMORPHONE HCL IN WATER/PF 6 MG/30 ML
0.2 PATIENT CONTROLLED ANALGESIA SYRINGE INTRAVENOUS EVERY 5 MIN PRN
Status: DISCONTINUED | OUTPATIENT
Start: 2023-06-14 | End: 2023-06-14 | Stop reason: HOSPADM

## 2023-06-14 RX ORDER — MULTIVITAMIN,THERAPEUTIC
1 TABLET ORAL DAILY
Status: DISCONTINUED | OUTPATIENT
Start: 2023-06-15 | End: 2023-06-19 | Stop reason: HOSPADM

## 2023-06-14 RX ADMIN — GLYCOPYRROLATE 0.2 MG: 0.2 INJECTION INTRAMUSCULAR; INTRAVENOUS at 10:03

## 2023-06-14 RX ADMIN — SODIUM CHLORIDE: 9 INJECTION, SOLUTION INTRAVENOUS at 13:59

## 2023-06-14 RX ADMIN — ACETAMINOPHEN 975 MG: 325 TABLET ORAL at 13:59

## 2023-06-14 RX ADMIN — OXYCODONE HYDROCHLORIDE 5 MG: 5 TABLET ORAL at 17:57

## 2023-06-14 RX ADMIN — FENTANYL CITRATE 25 MCG: 50 INJECTION, SOLUTION INTRAMUSCULAR; INTRAVENOUS at 12:30

## 2023-06-14 RX ADMIN — PHENYLEPHRINE HYDROCHLORIDE 0.1 MCG/KG/MIN: 10 INJECTION INTRAVENOUS at 11:01

## 2023-06-14 RX ADMIN — SODIUM CHLORIDE, POTASSIUM CHLORIDE, SODIUM LACTATE AND CALCIUM CHLORIDE: 600; 310; 30; 20 INJECTION, SOLUTION INTRAVENOUS at 09:17

## 2023-06-14 RX ADMIN — SODIUM CHLORIDE, POTASSIUM CHLORIDE, SODIUM LACTATE AND CALCIUM CHLORIDE: 600; 310; 30; 20 INJECTION, SOLUTION INTRAVENOUS at 11:45

## 2023-06-14 RX ADMIN — GABAPENTIN 300 MG: 300 CAPSULE ORAL at 21:17

## 2023-06-14 RX ADMIN — FENTANYL CITRATE 25 MCG: 50 INJECTION, SOLUTION INTRAMUSCULAR; INTRAVENOUS at 12:36

## 2023-06-14 RX ADMIN — ROCURONIUM BROMIDE 40 MG: 10 INJECTION, SOLUTION INTRAVENOUS at 10:11

## 2023-06-14 RX ADMIN — Medication 12 MCG: at 10:26

## 2023-06-14 RX ADMIN — Medication 12 MCG: at 10:03

## 2023-06-14 RX ADMIN — HYDROMORPHONE HYDROCHLORIDE 0.5 MG: 1 INJECTION, SOLUTION INTRAMUSCULAR; INTRAVENOUS; SUBCUTANEOUS at 12:10

## 2023-06-14 RX ADMIN — FENTANYL CITRATE 25 MCG: 50 INJECTION, SOLUTION INTRAMUSCULAR; INTRAVENOUS at 12:25

## 2023-06-14 RX ADMIN — CEFAZOLIN SODIUM 2 G: 2 INJECTION, SOLUTION INTRAVENOUS at 17:48

## 2023-06-14 RX ADMIN — OXYCODONE HYDROCHLORIDE 10 MG: 5 TABLET ORAL at 22:10

## 2023-06-14 RX ADMIN — HYDROXYZINE HYDROCHLORIDE 10 MG: 10 TABLET ORAL at 22:14

## 2023-06-14 RX ADMIN — DEXMEDETOMIDINE HYDROCHLORIDE 0.3 MCG/KG/HR: 100 INJECTION, SOLUTION INTRAVENOUS at 10:11

## 2023-06-14 RX ADMIN — DEXAMETHASONE SODIUM PHOSPHATE 5 MG: 10 INJECTION, SOLUTION INTRAMUSCULAR; INTRAVENOUS at 10:31

## 2023-06-14 RX ADMIN — PHENYLEPHRINE HYDROCHLORIDE 100 MCG: 10 INJECTION INTRAVENOUS at 10:57

## 2023-06-14 RX ADMIN — ROCURONIUM BROMIDE 30 MG: 10 INJECTION, SOLUTION INTRAVENOUS at 10:31

## 2023-06-14 RX ADMIN — ACETAMINOPHEN 975 MG: 325 TABLET ORAL at 21:16

## 2023-06-14 RX ADMIN — FENTANYL CITRATE 50 MCG: 50 INJECTION, SOLUTION INTRAMUSCULAR; INTRAVENOUS at 10:11

## 2023-06-14 RX ADMIN — Medication 8 MCG: at 10:09

## 2023-06-14 RX ADMIN — INSULIN ASPART 2 UNITS: 100 INJECTION, SOLUTION INTRAVENOUS; SUBCUTANEOUS at 17:47

## 2023-06-14 RX ADMIN — ONDANSETRON 4 MG: 2 INJECTION INTRAMUSCULAR; INTRAVENOUS at 10:31

## 2023-06-14 RX ADMIN — PROPOFOL 150 MCG/KG/MIN: 10 INJECTION, EMULSION INTRAVENOUS at 10:11

## 2023-06-14 RX ADMIN — LIDOCAINE HYDROCHLORIDE 2 ML: 10 INJECTION, SOLUTION INFILTRATION; PERINEURAL at 10:11

## 2023-06-14 RX ADMIN — FENTANYL CITRATE 50 MCG: 50 INJECTION, SOLUTION INTRAMUSCULAR; INTRAVENOUS at 10:31

## 2023-06-14 RX ADMIN — SUGAMMADEX 200 MG: 100 INJECTION, SOLUTION INTRAVENOUS at 12:03

## 2023-06-14 RX ADMIN — GABAPENTIN 100 MG: 100 CAPSULE ORAL at 08:37

## 2023-06-14 RX ADMIN — PROPOFOL 150 MG: 10 INJECTION, EMULSION INTRAVENOUS at 10:11

## 2023-06-14 RX ADMIN — HYDROMORPHONE HYDROCHLORIDE 0.5 MG: 1 INJECTION, SOLUTION INTRAMUSCULAR; INTRAVENOUS; SUBCUTANEOUS at 10:31

## 2023-06-14 ASSESSMENT — ACTIVITIES OF DAILY LIVING (ADL)
ADLS_ACUITY_SCORE: 44
ADLS_ACUITY_SCORE: 37
ADLS_ACUITY_SCORE: 44
ADLS_ACUITY_SCORE: 37
ADLS_ACUITY_SCORE: 35
ADLS_ACUITY_SCORE: 35
ADLS_ACUITY_SCORE: 38
ADLS_ACUITY_SCORE: 35

## 2023-06-14 NOTE — OP NOTE
DATE OF SERVICE: 6-14-23     PREOPERATIVE DIAGNOSES:   1. Severe spinal stenosis L3-4 with a degenerative spondylolisthesis.  Severe spinal stenosis at L2-3   2. Failure of extensive conservative measures.      POSTOPERATIVE DIAGNOSES:   same     PROCEDURE:   1. right-sided L3-4 transforaminal/transfacet decompression of the exiting L3 and   traversing L4 nerve root.   2. .  Intraoperative decision there was very little motion at the L3-4 level and therefore an interbody graft is not necessary and  posed more risk than benefit  3. left-sided posterior spinal fusion with decortication of the transverse   processes, the facet joint and the lamina.   4. Pedicle screw fixation in the pedicles of L3 and L4 bilaterally.   5.  Laminectomy L3-4    6.  Laminectomy decompression facetectomy L2-3     SURGEON: Dr. Ronaldo Hui.      ASSISTANT: Austin Juan PA-C who was needed for patient positioning, soft tissue   retraction, patient safety and assistance with closure of the wound.      ESTIMATED BLOOD LOSS: 200     DRAINS: 1 deep Hemovac drain due to the severe stenosis.      COMPLICATIONS: None perceived.      SPECIMENS SENT: None.      HISTORY OF PRESENT ILLNESS AND INDICATIONS FOR PROCEDURE:      This is a pleasant 83-year-old pt who has had pain in the bilateral lower extremities for many months now. We discussed risks benefits options and alternatives to surgery.  She had severe spinal stenosis and a degenerative spondylolisthesis at L3-4 .    She also needed laminectomy with bilateral partial medial facetectomy at L2-3. we discussed that the fusion would be necessary along with the decompression laminectomy surgery at L3-4.  We also discussed the risks of surgery including but not limited to, bleeding, infection, nerve damage, failure of the procedure to relieve symptoms, iatrogenic instability, errant instrumentation placement requiring revision or repair, DVT, PE, blindness and even death.     Therefore, the patient was  seen in the preop area of [facility] today. Low back was marked and the consent was again reverified. Pt was brought to the operating room, intubated via general endotracheal anesthetic and   placed on a Joseph table with a Constantino frame with care taken to pad all bony   prominences. Pause for the Cause was performed correctly identifying the patient,   procedure, proposed plan and radiographs and all were in agreement. We then prepped   and draped the patient in a standard fashion for a lumbar spine procedure. We   localized our incision and dissected down over the L3 hemilamina bilaterally. We performed a left L3-4 hemilaminotomy and then scored the pars intraarticularis. We used an osteotome to remove the descending   articular process of L3 flush with the pedicle. We then removed the ascending   articular process of L4 flush with the pedicle which gave us our   transforaminal/transfacet decompression of the exiting L3 and traversing L4 nerve   roots.  There was a severe left foraminal stenosis on the right side.  Was also some disc material underneath the dura.  As we exposed all this, there was very little motion at the level and the disc space was relatively high compared to the nerve and I impression was that placing the graft would pose more risk than benefit so we abandoned the interbody graft.  We then performed a full laminectomy on the  at L3-4.  We then traveled to the L2-3 level and performed a laminectomy with bilateral partial medial facetectomies.  There was severe stenosis     We then turned our attention to placing our pedicle screws. We then used a Midas   Rick demarco for our starting point. Then used a pedicle probe to cannulate the   pedicle. We then used a Torres probe to palpate our cannulated pedicle to make sure   there were no breaches. We then used a 5.5 mm tap again, using our Torres probe to   palpate our cannulated pedicle. We placed 6.5 x50 screws in the pedicles of L4 and  L3  bilaterally. All screws had good position on PA and lateral x-ray. We then decorticated our right-sided transverse   processes, facet joint and lamina, and placed autograft bone . We then placed our 4 small connectors and two rods which were tightened to 's specifications. We then irrigated the wound copiously, placed vancomycin powder in the wound and closed the deep fascia with #1 Vicryl, subcutaneous tissue with 2-0 Vicryl, skin with 3-0 Vicryl. Sterile dressing was applied.   The patient tolerated procedure well. There were no apparent complications. She will be weightbearing as tolerated bilateral lower extremities with strict instructions to avoid bending, lifting and twisting over the next 6 weeks. She   will have early mobilization and CHANDRAKANT stockings for DVT prophylaxis and 2 grams of Ancef IV q.8 hours x2 doses for antibiotics. Return to see me in 6 weeks, sooner if there are any problems, questions or concerns.         Instrumentation used for this case was a [employer]tronic TSRH 3Dx screws with 4   small connectors and two rods.

## 2023-06-14 NOTE — ANESTHESIA PROCEDURE NOTES
Airway       Patient location during procedure: OR       Procedure Start/Stop Times: 6/14/2023 10:16 AM  Staff -        CRNA: Desmond Lu APRN CRNA       Performed By: CRNA  Consent for Airway        Urgency: elective  Indications and Patient Condition       Indications for airway management: astrid-procedural       Induction type:intravenous       Mask difficulty assessment: 1 - vent by mask    Final Airway Details       Final airway type: endotracheal airway       Successful airway: ETT - single  Endotracheal Airway Details        ETT size (mm): 7.0       Cuffed: yes       Successful intubation technique: direct laryngoscopy       DL Blade Type: Roberts 2       Grade View of Cords: 1       Adjucts: stylet       Position: Right       Measured from: lips       Secured at (cm): 22    Post intubation assessment        Placement verified by: capnometry and equal breath sounds        Number of attempts at approach: 1       Number of other approaches attempted: 0       Secured with: silk tape       Ease of procedure: easy       Dentition: Intact and Unchanged       Dental guard used and removed.    Medication(s) Administered   Medication Administration Time: 6/14/2023 10:16 AM

## 2023-06-14 NOTE — PROGRESS NOTES
06/14/23 1609   Appointment Info   Signing Clinician's Name / Credentials (PT) Sravan Shelbybarbra   Living Environment   People in Home spouse   Current Living Arrangements house   Home Accessibility stairs to enter home   Number of Stairs, Main Entrance 3   Stair Railings, Main Entrance railings safe and in good condition   Transportation Anticipated family or friend will provide   Self-Care   Usual Activity Tolerance moderate   Equipment Currently Used at Home cane, quad;grab bar, tub/shower;shower chair;raised toilet seat;walker, rolling   Fall history within last six months no   Activity/Exercise/Self-Care Comment ind with all adls, still driving, used cane in public, walker at night, limited by pain for distance, retired RN   General Information   Onset of Illness/Injury or Date of Surgery 06/14/23   Pertinent History of Current Problem (include personal factors and/or comorbidities that impact the POC) 1. right-sided L3-4 transforaminal/transfacet decompression of the exiting L3 and   traversing L4 nerve root.   2. .  Intraoperative decision there was very little motion at the L3-4 level and therefore an interbody graft is not necessary and  posed more risk than benefit  3. left-sided posterior spinal fusion with decortication of the transverse   processes, the facet joint and the lamina.   4. Pedicle screw fixation in the pedicles of L3 and L4 bilaterally.   5.  Laminectomy L3-4    6.  Laminectomy decompression facetectomy L2-3   Existing Precautions/Restrictions spinal   Cognition   Affect/Mental Status (Cognition) WFL   Range of Motion (ROM)   Range of Motion ROM deficits secondary to surgical procedure   Strength (Manual Muscle Testing)   Strength (Manual Muscle Testing) No deficits observed during functional mobility   Transfers   Transfers sit-stand transfer   Sit-Stand Transfer   Sit-Stand [place] (Transfers) contact guard;verbal cues   Assistive Device (Sit-Stand Transfers) walker, front-wheeled    Gait/Stairs (Locomotion)   [place] Level (Gait) contact guard;verbal cues;supervision   Assistive Device (Gait) walker, front-wheeled   Distance in Feet 20   Distance in Feet (Gait) 140   Pattern (Gait) step-to   Deviations/Abnormal Patterns (Gait) gait speed decreased;wes decreased   Balance   Balance no deficits were identified   Clinical Impression   Criteria for Skilled Therapeutic Intervention Yes, treatment indicated   PT Diagnosis (PT) impaired functional mobility   Influenced by the following impairments weakness, pain   Functional limitations due to impairments transfers, gait   Clinical Presentation (PT Evaluation Complexity) Stable/Uncomplicated   Clinical Presentation Rationale Pt. presents as medically diagnosed   Clinical Decision Making (Complexity) low complexity   Planned Therapy Interventions (PT) gait training;home exercise program;patient/family education;transfer training;stair training   Anticipated Equipment Needs at Discharge (PT) walker, rolling   Risk & Benefits of therapy have been explained evaluation/treatment results reviewed;patient   PT Total Evaluation Time   PT Eval, Low Complexity Minutes (46147) 10   Plan of Care Review   Plan of Care Reviewed With patient;spouse   Physical Therapy Goals   PT Frequency 2x/day   PT Predicted Duration/Target Date for Goal Attainment 06/19/23   PT Goals Transfers;Gait;Stairs;PT Goal 1   PT: Transfers Modified independent;Sit to/from stand;Within precautions;Assistive device   PT: Gait Modified independent;Rolling walker;150 feet;Within precautions   PT: Stairs Minimal assist;4 stairs;Rail on right;Within precautions   PT: Goal 1 Indep with HEP   Interventions   Interventions Quick Adds Gait Training;Therapeutic Activity   Therapeutic Activity   Therapeutic Activities: dynamic activities to improve functional performance Minutes (95618) 8   Treatment Detail/Skilled Intervention cueing for hands with STS, SBA STS, reviewed precaution, supine  exercises, paitent and spouse had many questions about activity/restrictions going forward   Gait Training   Gait Training Minutes (03664) 16   Symptoms Noted During/After Treatment (Gait Training) fatigue   Treatment Detail/Skilled Intervention very slow stable gait, vc for reciprocal steps/use of walker   [place] Level (Gait Training) contact guard   Physical Assistance Level (Gait Training) 1 person assist;verbal cues;supervision   Weight Bearing (Gait Training) weight-bearing as tolerated   Assistive Device (Gait Training) rolling walker   Pattern Analysis (Gait Training) swing-through gait   Gait Analysis Deviations decreased wes;decreased step length   Impairments (Gait Analysis/Training) pain;strength decreased   PT Discharge Planning   PT Plan progress with gait/stairs,  HEP, moving slowly but safely, retired RN   PT Discharge Recommendation (DC Rec) home with assist   PT Rationale for DC Rec Patient mobilizing well overall, has good home setup and support   PT Brief overview of current status Patient cga for gait and transfers   Total Session Time   Timed Code Treatment Minutes 24   Total Session Time (sum of timed and untimed services) 34

## 2023-06-14 NOTE — PLAN OF CARE
Problem: Plan of Care - These are the overarching goals to be used throughout the patient stay.    Goal: Absence of Hospital-Acquired Illness or Injury  Intervention: Identify and Manage Fall Risk  Recent Flowsheet Documentation  Taken 6/14/2023 1520 by Sari Crook RN  Safety Promotion/Fall Prevention:    activity supervised    assistive device/personal items within reach    clutter free environment maintained    lighting adjusted    nonskid shoes/slippers when out of bed    safety round/check completed  Taken 6/14/2023 1320 by Sari Crook RN  Safety Promotion/Fall Prevention:    activity supervised    assistive device/personal items within reach    clutter free environment maintained    lighting adjusted    nonskid shoes/slippers when out of bed    safety round/check completed     Patient vital signs are at baseline: Yes  Patient able to ambulate as they were prior to admission or with assist devices provided by therapies during their stay:  Yes  Patient MUST void prior to discharge:  No,  Reason:  due to void  Patient able to tolerate oral intake:  Yes, full liquid diet  Pain has adequate pain control using Oral analgesics:  Yes  Does patient have an identified :  Yes  Has goal D/C date and time been discussed with patient:  Yes    Patient A&O, VSS, and CMS intact. Dressing CDI. Pain is manageable. Patient has ambulated and tolerated full liquid diet. Due to void. Up with a of 1 and walker. Call light appropriate and all alarms on.  Sari Crook RN

## 2023-06-14 NOTE — INTERVAL H&P NOTE
"I have reviewed the surgical (or preoperative) H&P that is linked to this encounter, and examined the patient. There are no significant changes    Clinical Conditions Present on Arrival:  Clinically Significant Risk Factors Present on Admission                  # Obesity: Estimated body mass index is 35.53 kg/m  as calculated from the following:    Height as of this encounter: 1.6 m (5' 3\").    Weight as of this encounter: 91 kg (200 lb 9.6 oz).       "

## 2023-06-14 NOTE — PHARMACY-ADMISSION MEDICATION HISTORY
Pharmacist Admission Medication History    Admission medication history is complete. The information provided in this note is only as accurate as the sources available at the time of the update.    Medication reconciliation/reorder completed by provider prior to medication history? No    Information Source(s): Patient and CareEverywhere/SureScripts via in-person    Pertinent Information:     Changes made to PTA medication list:    Added: apap, b12, slade, prlosec, sitagliptin, vesicare    Deleted: oxycodone, ditropan, fish oil, flonase, doxy    Changed: None    Medication Affordability:       Allergies reviewed with patient and updates made in EHR: yes    Medication History Completed By: Yumiko Goldstein RPH 6/14/2023 9:13 AM    Prior to Admission medications    Medication Sig Last Dose Taking? Auth Provider Long Term End Date   acetaminophen (TYLENOL) 500 MG tablet Take 500 mg by mouth 2 times daily 6/14/2023 at 0400 Yes Unknown, Entered By History     cholecalciferol, vitamin D3, (VITAMIN D3) 2,000 unit cap [CHOLECALCIFEROL, VITAMIN D3, (VITAMIN D3) 2,000 UNIT CAP] Take 1 capsule by mouth daily. 6/13/2023 Yes Provider, Historical     cyanocobalamin (VITAMIN B-12) 500 MCG SUBL sublingual tablet Place 500 mcg under the tongue daily 6/13/2023 Yes Unknown, Entered By History     diphenhydrAMINE-acetaminophen (TYLENOL PM EXTRA STRENGTH)  mg Tab [DIPHENHYDRAMINE-ACETAMINOPHEN (TYLENOL PM EXTRA STRENGTH)  MG TAB] Take 1 tablet by mouth bedtime as needed. prn Yes Provider, Historical     fexofenadine (ALLEGRA) 180 MG tablet [FEXOFENADINE (ALLEGRA) 180 MG TABLET] Take 180 mg by mouth daily. 6/13/2023 Yes Provider, Historical     gabapentin (NEURONTIN) 300 MG capsule Take 300 mg by mouth At Bedtime 6/13/2023 Yes Unknown, Entered By History     ipratropium-albuterol (COMBIVENT RESPIMAT)  MCG/ACT inhaler Inhale 1 puff into the lungs 2 times daily as needed for shortness of breath, wheezing or cough prn at  pt supplied Yes Unknown, Entered By History Yes    levothyroxine (SYNTHROID, LEVOTHROID) 125 MCG tablet [LEVOTHYROXINE (SYNTHROID, LEVOTHROID) 125 MCG TABLET] Take 125 mcg by mouth daily. 6/13/2023 at am Yes Provider, Historical     metFORMIN (GLUCOPHAGE-XR) 500 MG 24 hr tablet Take 500 mg by mouth 2 times daily (with meals) 6/13/2023 Yes Provider, Historical     omeprazole (PRILOSEC) 20 MG DR capsule Take 20 mg by mouth daily 6/13/2023 at am Yes Unknown, Entered By History     sitagliptin (JANUVIA) 100 MG tablet Take 100 mg by mouth daily 6/13/2023 Yes Unknown, Entered By History Yes    solifenacin (VESICARE) 10 MG tablet Take 10 mg by mouth daily 6/13/2023 at am Yes Unknown, Entered By History

## 2023-06-14 NOTE — ANESTHESIA PREPROCEDURE EVALUATION
Anesthesia Pre-Procedure Evaluation    Patient: Saima Posadas   MRN: [id number] : 1940        Procedure : Procedure(s):  BILATERAL LUMBAR 3-4 TRANSFORAMINAL LUMBAR INTERBODY FUSION  AND BILATERAL LUMBAR 2-3 LAMINECTOMY WITH AUTOGRAFT          Past Medical History:   Diagnosis Date     Diabetes (H)      Gastroesophageal reflux disease      Thyroid disease       Past Surgical History:   Procedure Laterality Date     BREAST CYST INCISION AND DRAINAGE       DILATION AND CURETTAGE       MASTECTOMY Left      RELEASE CARPAL TUNNEL       SEPTOPLASTY       TUBAL LIGATION       UVULECTOMY       ZZC TOTAL KNEE ARTHROPLASTY Right 2017    Procedure:  RIGHT TOTAL KNEE  ARTHROPLASTY;  Surgeon: Daniel Mckinney MD;  Location: [facility];  Service: Orthopedics      Allergies   Allergen Reactions     Aspirin Unknown     Nose bleeds     Ipratropium-Albuterol Unknown and Other (See Comments)     na       Other Environmental Allergy Other (See Comments)     Sneezing, itchy, watery eyes      Social History     Tobacco Use     Smoking status: Former     Types: Cigarettes     Quit date: 2005     Years since quittin.4     Smokeless tobacco: Not on file   Vaping Use     Vaping status: Not on file   Substance Use Topics     Alcohol use: Yes     Alcohol/week: 1.0 standard drink of alcohol     Comment: Alcoholic Drinks/day: daily      Wt Readings from Last 1 Encounters:   23 91 kg (200 lb 9.6 oz)        Anesthesia Evaluation            ROS/MED HX  ENT/Pulmonary:       Neurologic:       Cardiovascular:       METS/Exercise Tolerance:     Hematologic:       Musculoskeletal:       GI/Hepatic:     (+) GERD,     Renal/Genitourinary:       Endo:     (+) type I DM, thyroid problem,     Psychiatric/Substance Use:       Infectious Disease:       Malignancy:       Other:            Physical Exam    Airway        Mallampati: II    Neck ROM: full     Respiratory Devices and Support         Dental           Cardiovascular    cardiovascular exam normal          Pulmonary   pulmonary exam normal                OUTSIDE LABS:  CBC:   Lab Results   Component Value Date    WBC 10.4 01/09/2019    HGB 13.5 01/09/2019    HCT 40.3 01/09/2019     01/09/2019     BMP:   Lab Results   Component Value Date     (H) 06/14/2023     COAGS: No results found for: PTT, INR, FIBR  POC: No results found for: BGM, HCG, HCGS  HEPATIC: No results found for: ALBUMIN, PROTTOTAL, ALT, AST, GGT, ALKPHOS, BILITOTAL, BILIDIRECT, BRICE  OTHER: No results found for: PH, LACT, A1C, PRESTON, PHOS, MAG, LIPASE, AMYLASE, TSH, T4, T3, CRP, SED    Anesthesia Plan    ASA Status:  3      Anesthesia Type: General.     - Airway: ETT   Induction: Intravenous.           Consents    Anesthesia Plan(s) and associated risks, benefits, and realistic alternatives discussed. Questions answered and patient/representative(s) expressed understanding.    - Discussed:     - Discussed with:  Patient         Postoperative Care       PONV prophylaxis: Ondansetron (or other 5HT-3)     Comments:                Claudio Sands MD

## 2023-06-14 NOTE — CONSULTS
[facility] MEDICINE CONSULT NOTE   Physician requesting consult: Poncho Hui MD    Reason for consult: Postoperative medical management of medical co-morbidities as below    Assessment and Plan      83 year old female into [facility] on 6/14/2023 after presenting for an elective bilateral L2-3 lumbar  Laminectomy with fusion of L3/4.     [facility] service was asked to evaluate patient for postoperative medical management as follows below. Please resume the home medications as reconciled and further noted below with ordered hold parameters.  Vital signs have been stable post-operatively including hemodynamically stable blood pressure and heart rate. Thank you for this consult; we will continue to follow this patient until discharge.    Problem list:    1.  POD #0 L2-3 laminectomy with L3-4 fusion:  Per ortho  2.  DM2, noninsulin: metformin, sitagliptin; fingersticks; bolus insulin if needed  3.  Hypothyroidism: synthroid  4.  Neuropathy/chronic pain:  neurontin  5.  COPD:  Combivent;   6.  GERD: prilosec  7.  Allergies:  Allegra,   8.  dvt prevention:  Per ortho      -Reviewed the patient's preoperative H and P and updated missing elements.  -Home medication reconciliation has been reviewed.  Medications have been ordered as noted from the home list and changes are documented above         Past Medical History     Patient Active Problem List    Diagnosis Date Noted     Lumbar spinal stenosis 06/14/2023     Priority: Medium     Rotator cuff dysfunction      Priority: Medium     Status post right knee replacement 01/16/2017     Priority: Medium     Diabetes mellitus type 2 in obese (H)      Priority: Medium     Lumbar radiculopathy      Priority: Medium     Acne rosacea      Priority: Medium        Surgical History   She  has a past surgical history that includes Septoplasty; Uvulectomy; Dilation and curettage; Mastectomy (Left); Breast Cyst Incision And Drainage; tubal ligation; Release  carpal tunnel; and TOTAL KNEE ARTHROPLASTY (Right, 2017).     Past Surgical History:   Procedure Laterality Date     BREAST CYST INCISION AND DRAINAGE       DILATION AND CURETTAGE       MASTECTOMY Left      RELEASE CARPAL TUNNEL       SEPTOPLASTY       TUBAL LIGATION       UVULECTOMY       ZZC TOTAL KNEE ARTHROPLASTY Right 2017    Procedure:  RIGHT TOTAL KNEE  ARTHROPLASTY;  Surgeon: Daniel Mckinney MD;  Location: [facility];  Service: Orthopedics       Family History    Reviewed, and family history is not on file.    Social History    Reviewed, and she  reports that she quit smoking about 18 years ago. Her smoking use included cigarettes. She does not have any smokeless tobacco history on file. She reports current alcohol use of about 1.0 standard drink of alcohol per week. She reports that she does not use drugs.  Social History     Tobacco Use     Smoking status: Former     Types: Cigarettes     Quit date: 2005     Years since quittin.4     Smokeless tobacco: Not on file   Vaping Use     Vaping status: Not on file   Substance Use Topics     Alcohol use: Yes     Alcohol/week: 1.0 standard drink of alcohol     Comment: Alcoholic Drinks/day: daily       Allergies     Allergies   Allergen Reactions     Aspirin Unknown     Nose bleeds     Ipratropium-Albuterol Unknown and Other (See Comments)     na       Other Environmental Allergy Other (See Comments)     Sneezing, itchy, watery eyes       Prior to Admission Medications      Medications Prior to Admission   Medication Sig Dispense Refill Last Dose     acetaminophen (TYLENOL) 500 MG tablet Take 500 mg by mouth 2 times daily   2023 at 0400     cholecalciferol, vitamin D3, (VITAMIN D3) 2,000 unit cap [CHOLECALCIFEROL, VITAMIN D3, (VITAMIN D3) 2,000 UNIT CAP] Take 1 capsule by mouth daily.   2023     cyanocobalamin (VITAMIN B-12) 500 MCG SUBL sublingual tablet Place 500 mcg under the tongue daily   2023      diphenhydrAMINE-acetaminophen (TYLENOL PM EXTRA STRENGTH)  mg Tab [DIPHENHYDRAMINE-ACETAMINOPHEN (TYLENOL PM EXTRA STRENGTH)  MG TAB] Take 1 tablet by mouth bedtime as needed.   prn     fexofenadine (ALLEGRA) 180 MG tablet [FEXOFENADINE (ALLEGRA) 180 MG TABLET] Take 180 mg by mouth daily.   6/13/2023     gabapentin (NEURONTIN) 300 MG capsule Take 300 mg by mouth At Bedtime   6/13/2023     ipratropium-albuterol (COMBIVENT RESPIMAT)  MCG/ACT inhaler Inhale 1 puff into the lungs 2 times daily as needed for shortness of breath, wheezing or cough   prn at pt supplied     levothyroxine (SYNTHROID, LEVOTHROID) 125 MCG tablet [LEVOTHYROXINE (SYNTHROID, LEVOTHROID) 125 MCG TABLET] Take 125 mcg by mouth daily.   6/13/2023 at am     metFORMIN (GLUCOPHAGE-XR) 500 MG 24 hr tablet Take 500 mg by mouth 2 times daily (with meals)   6/13/2023     omeprazole (PRILOSEC) 20 MG DR capsule Take 20 mg by mouth daily   6/13/2023 at am     sitagliptin (JANUVIA) 100 MG tablet Take 100 mg by mouth daily   6/13/2023     solifenacin (VESICARE) 10 MG tablet Take 10 mg by mouth daily   6/13/2023 at am       Review of Systems   A 12 point comprehensive review of systems was negative except as noted above.    OBJECTIVE         Physical Exam   Temp:  [97.4  F (36.3  C)-97.9  F (36.6  C)] 97.8  F (36.6  C)  Pulse:  [57-72] 62  Resp:  [16-18] 16  BP: ()/(53-65) 118/58  SpO2:  [93 %-99 %] 96 %  Body mass index is 35.53 kg/m .     GENERAL:  Alert, mild distress (from low back pain)   HEAD:  Normocephalic, without obvious abnormality, atraumatic   EYES:  PERRL, conjunctiva/corneas clear, no scleral icterus, EOM's intact   NOSE: Nares normal, septum midline, mucosa normal, no drainage   THROAT: Lips, mucosa, and tongue normal; teeth and gums normal, mouth moist   NECK: Supple, symmetrical, trachea midline   BACK:   Symmetric, no curvature, ROM normal   LUNGS:   Clear to auscultation bilaterally, no rales, rhonchi, or wheezing,  symmetric chest rise on inhalation, respirations unlabored   CHEST WALL:  No tenderness or deformity   HEART:  Regular rate and rhythm, S1 and S2 normal, no murmur, rub, or gallop    ABDOMEN:   Soft, non-tender, bowel sounds active all four quadrants, no masses, no organomegaly, no rebound or guarding   EXTREMITIES: Extremities normal, atraumatic, no cyanosis or edema    SKIN: Dry to touch, no exanthems in the visualized areas   NEURO: Alert, oriented x 4, moves all four extremities freely/spontaneously   PSYCH: Cooperative, behavior is appropriate        Imaging Reviewed Personally By Myself    Radiology Results:   Recent Results (from the past 24 hour(s))   XR Surgery TOVA  Fluoro G/T 5 Min    Narrative    This exam was marked as non-reportable because it will not be read by a   radiologist or a [place] non-radiologist provider.             Preoperative Labs Reviewed Personally By Myself     Thank you for this consultation.  Appreciate the opportunity to participate in the care of Saima Posadas, please feel free to contact us for any questions or concerns.    Yumiko Rojas MD  [facility]  Phone: #[phone]

## 2023-06-14 NOTE — ANESTHESIA CARE TRANSFER NOTE
Patient: Saima Posadas    Procedure: Procedure(s):  AND BILATERAL LUMBAR 2-3 LAMINECTOMY WITH AUTOGRAFT  BILATERAL INSTRUMENTED FUSION LUMBAR 3-4       Diagnosis: Lumbar stenosis [M48.061]  Lumbar radicular pain [M54.16]  Spondylolisthesis of lumbar region [M43.16]  Diagnosis Additional Information: No value filed.    Anesthesia Type:   General     Note:    Oropharynx: oropharynx clear of all foreign objects  Level of Consciousness: awake  Oxygen Supplementation: face mask  Level of Supplemental Oxygen (L/min / FiO2): 8  Independent Airway: airway patency satisfactory and stable  Dentition: dentition unchanged  Vital Signs Stable: post-procedure vital signs reviewed and stable  Report to RN Given: handoff report given  Patient transferred to: PACU    Handoff Report: Identifed the Patient, Identified the Reponsible Provider, Reviewed the pertinent medical history, Discussed the surgical course, Reviewed Intra-OP anesthesia mangement and issues during anesthesia, Set expectations for post-procedure period and Allowed opportunity for questions and acknowledgement of understanding      Vitals:  Vitals Value Taken Time   BP 92/55 06/14/23 1213   Temp 36.6  C (97.9  F) 06/14/23 1212   Pulse 58 06/14/23 1218   Resp 9 06/14/23 1218   SpO2 99 % 06/14/23 1218   Vitals shown include unvalidated device data.    Electronically Signed By: AVELINA Toure CRNA  June 14, 2023  12:20 PM

## 2023-06-14 NOTE — ANESTHESIA POSTPROCEDURE EVALUATION
Patient: Saima Posadas    Procedure: Procedure(s):  AND BILATERAL LUMBAR 2-3 LAMINECTOMY WITH AUTOGRAFT  BILATERAL INSTRUMENTED FUSION LUMBAR 3-4       Anesthesia Type:  General    Note:  Disposition: Inpatient   Postop Pain Control: Uneventful            Sign Out: Well controlled pain   PONV: No   Neuro/Psych: Uneventful            Sign Out: Acceptable/Baseline neuro status   Airway/Respiratory: Uneventful            Sign Out: AIRWAY IN SITU/Resp. Support   CV/Hemodynamics: Uneventful            Sign Out: Acceptable CV status; No obvious hypovolemia; No obvious fluid overload   Other NRE:    DID A NON-ROUTINE EVENT OCCUR?            Last vitals:  Vitals Value Taken Time   /63 06/14/23 1250   Temp 36.3  C (97.4  F) 06/14/23 1250   Pulse 69 06/14/23 1259   Resp 13 06/14/23 1257   SpO2 97 % 06/14/23 1259   Vitals shown include unvalidated device data.    Electronically Signed By: Claudio Sands MD  June 14, 2023  1:49 PM

## 2023-06-15 ENCOUNTER — APPOINTMENT (OUTPATIENT)
Dept: OCCUPATIONAL THERAPY | Facility: CLINIC | Age: 83
DRG: 460 | End: 2023-06-15
Attending: PHYSICIAN ASSISTANT
Payer: COMMERCIAL

## 2023-06-15 ENCOUNTER — APPOINTMENT (OUTPATIENT)
Dept: PHYSICAL THERAPY | Facility: CLINIC | Age: 83
DRG: 460 | End: 2023-06-15
Attending: ORTHOPAEDIC SURGERY
Payer: COMMERCIAL

## 2023-06-15 LAB
GLUCOSE BLDC GLUCOMTR-MCNC: 121 MG/DL (ref 70–99)
GLUCOSE BLDC GLUCOMTR-MCNC: 134 MG/DL (ref 70–99)
GLUCOSE BLDC GLUCOMTR-MCNC: 158 MG/DL (ref 70–99)
GLUCOSE BLDC GLUCOMTR-MCNC: 180 MG/DL (ref 70–99)

## 2023-06-15 PROCEDURE — 97530 THERAPEUTIC ACTIVITIES: CPT | Mod: GP

## 2023-06-15 PROCEDURE — 97116 GAIT TRAINING THERAPY: CPT | Mod: GP

## 2023-06-15 PROCEDURE — 250N000013 HC RX MED GY IP 250 OP 250 PS 637: Performed by: PHYSICIAN ASSISTANT

## 2023-06-15 PROCEDURE — 97166 OT EVAL MOD COMPLEX 45 MIN: CPT | Mod: GO

## 2023-06-15 PROCEDURE — 97535 SELF CARE MNGMENT TRAINING: CPT | Mod: GO

## 2023-06-15 PROCEDURE — 250N000013 HC RX MED GY IP 250 OP 250 PS 637: Performed by: EMERGENCY MEDICINE

## 2023-06-15 PROCEDURE — 99232 SBSQ HOSP IP/OBS MODERATE 35: CPT | Performed by: EMERGENCY MEDICINE

## 2023-06-15 PROCEDURE — 120N000001 HC R&B MED SURG/OB

## 2023-06-15 PROCEDURE — 250N000011 HC RX IP 250 OP 636: Performed by: PHYSICIAN ASSISTANT

## 2023-06-15 RX ADMIN — FEXOFENADINE HYDROCHLORIDE 180 MG: 180 TABLET ORAL at 08:27

## 2023-06-15 RX ADMIN — OXYCODONE HYDROCHLORIDE 5 MG: 5 TABLET ORAL at 19:55

## 2023-06-15 RX ADMIN — TOLTERODINE 4 MG: 2 CAPSULE, EXTENDED RELEASE ORAL at 08:28

## 2023-06-15 RX ADMIN — SENNOSIDES AND DOCUSATE SODIUM 1 TABLET: 50; 8.6 TABLET ORAL at 08:27

## 2023-06-15 RX ADMIN — Medication 1000 MCG: at 08:28

## 2023-06-15 RX ADMIN — HYDROXYZINE HYDROCHLORIDE 10 MG: 10 TABLET ORAL at 17:07

## 2023-06-15 RX ADMIN — OXYCODONE HYDROCHLORIDE 10 MG: 5 TABLET ORAL at 06:04

## 2023-06-15 RX ADMIN — ACETAMINOPHEN 975 MG: 325 TABLET ORAL at 14:16

## 2023-06-15 RX ADMIN — HYDROMORPHONE HYDROCHLORIDE 0.2 MG: 0.2 INJECTION, SOLUTION INTRAMUSCULAR; INTRAVENOUS; SUBCUTANEOUS at 20:50

## 2023-06-15 RX ADMIN — INSULIN ASPART 1 UNITS: 100 INJECTION, SOLUTION INTRAVENOUS; SUBCUTANEOUS at 17:07

## 2023-06-15 RX ADMIN — ACETAMINOPHEN 975 MG: 325 TABLET ORAL at 21:56

## 2023-06-15 RX ADMIN — SENNOSIDES AND DOCUSATE SODIUM 1 TABLET: 50; 8.6 TABLET ORAL at 19:56

## 2023-06-15 RX ADMIN — Medication 50 MCG: at 08:28

## 2023-06-15 RX ADMIN — GABAPENTIN 300 MG: 300 CAPSULE ORAL at 19:55

## 2023-06-15 RX ADMIN — LEVOTHYROXINE SODIUM 125 MCG: 125 TABLET ORAL at 06:04

## 2023-06-15 RX ADMIN — METFORMIN ER 500 MG 500 MG: 500 TABLET ORAL at 08:27

## 2023-06-15 RX ADMIN — ACETAMINOPHEN 975 MG: 325 TABLET ORAL at 06:04

## 2023-06-15 RX ADMIN — THERA TABS 1 TABLET: TAB at 08:27

## 2023-06-15 RX ADMIN — OXYCODONE HYDROCHLORIDE 5 MG: 5 TABLET ORAL at 10:58

## 2023-06-15 RX ADMIN — METFORMIN ER 500 MG 500 MG: 500 TABLET ORAL at 17:07

## 2023-06-15 RX ADMIN — SITAGLIPTIN 100 MG: 100 TABLET, FILM COATED ORAL at 08:27

## 2023-06-15 RX ADMIN — CEFAZOLIN SODIUM 2 G: 2 INJECTION, SOLUTION INTRAVENOUS at 00:40

## 2023-06-15 RX ADMIN — POLYETHYLENE GLYCOL 3350 17 G: 17 POWDER, FOR SOLUTION ORAL at 08:27

## 2023-06-15 RX ADMIN — HYDROXYZINE HYDROCHLORIDE 10 MG: 10 TABLET ORAL at 08:32

## 2023-06-15 ASSESSMENT — ACTIVITIES OF DAILY LIVING (ADL)
ADLS_ACUITY_SCORE: 44
ADLS_ACUITY_SCORE: 42
ADLS_ACUITY_SCORE: 44
ADLS_ACUITY_SCORE: 42
ADLS_ACUITY_SCORE: 44

## 2023-06-15 NOTE — PROGRESS NOTES
S: Pt. seen in the [facility]. Female. Chris GOMEZ. RX: Assess for brace. DX: P/O spine.  O:A: Today I F/D an Aspen Quikdraw RAP LSO size X-Lg. to support surgical site. Donning doffing wear and care instructions given verbally and hard copy.  P: Pt. to be seen as needed.    Jordan FARR,LO

## 2023-06-15 NOTE — PLAN OF CARE
[id number]:    Alert and oriented, calm and cooperative with cares. Given Oxycodone for pain. CMS intact. Hemovac pulled this morning to low output. Done with 3 PVR's. Used purewick overnight for urgency and dribbling.       Problem: Plan of Care - These are the overarching goals to be used throughout the patient stay.    Goal: Optimal Comfort and Wellbeing  Outcome: Progressing  Intervention: Monitor Pain and Promote Comfort  Recent Flowsheet Documentation  Taken 6/14/2023 2210 by Inga Ogden, RN  Pain Management Interventions: medication (see MAR)  Taken 6/14/2023 2116 by Inga Ogden, RN  Pain Management Interventions: medication (see MAR)     Problem: Plan of Care - These are the overarching goals to be used throughout the patient stay.    Goal: Readiness for Transition of Care  Outcome: Progressing

## 2023-06-15 NOTE — PROGRESS NOTES
06/15/23 0947   Appointment Info   Signing Clinician's Name / Credentials (OT) MANUEL Loya   Living Environment   People in Home spouse   Current Living Arrangements house   Transportation Anticipated family or friend will provide   Living Environment Comments Pt has FWW, cane, walkin shower w/ grab bars and shower chair, RTS w/ handles, reacher, sock aid   Self-Care   Usual Activity Tolerance moderate   Current Activity Tolerance fair   Equipment Currently Used at Home cane, quad;grab bar, toilet;grab bar, tub/shower;raised toilet seat;shower chair;walker, rolling;dressing device   Fall history within last six months no   Activity/Exercise/Self-Care Comment Pt IND w/ ADLs and IADLs at baseline, limited mobility lately   General Information   Onset of Illness/Injury or Date of Surgery 06/14/23   Referring Physician Poncho Hui MD   Patient/Family Therapy Goal Statement (OT) move better and feel less pain   Additional Occupational Profile Info/Pertinent History of Current Problem 83 year old female into [facility] on 6/14/2023 after presenting for an elective bilateral L2-3 lumbar  Laminectomy with fusion of L3/4.   Existing Precautions/Restrictions spinal   Cognitive Status Examination   Orientation Status orientation to person, place and time   Visual Perception   Visual Impairment/Limitations corrective lenses full-time   Sensory   Sensory Quick Adds sensation intact   Pain Assessment   Patient Currently in Pain Yes, see Vital Sign flowsheet   Posture   Posture not impaired   Range of Motion Comprehensive   General Range of Motion no range of motion deficits identified   Strength Comprehensive (MMT)   General Manual Muscle Testing (MMT) Assessment no strength deficits identified   Bed Mobility   Bed Mobility scooting/bridging;rolling left;rolling right;supine-sit;sit-supine   Comment (Bed Mobility) Min A for bed mobility   Transfers   Transfers bed-chair transfer;sit-stand transfer;toilet  transfer;shower transfer   Transfer Comments SBA-CGA for transfers   Activities of Daily Living   BADL Assessment/Intervention bathing;upper body dressing;lower body dressing;toileting   Bathing Assessment/Intervention   [place] Level (Bathing) minimum assist (75% patient effort)   Upper Body Dressing Assessment/Training   [place] Level (Upper Body Dressing) moderate assist (50% patient effort)   Lower Body Dressing Assessment/Training   [place] Level (Lower Body Dressing) moderate assist (50% patient effort)   Toileting   [place] Level (Toileting) minimum assist (75% patient effort)   Clinical Impression   Criteria for Skilled Therapeutic Interventions Met (OT) Yes, treatment indicated   OT Diagnosis decreased ADLs   Influenced by the following impairments lumbar fusion/lami   OT Problem List-Impairments impacting ADL activity tolerance impaired;mobility;pain;post-surgical precautions   Assessment of Occupational Performance 3-5 Performance Deficits   Identified Performance Deficits dressing, bed mobility, all transfers, bathing, toileting, g/h   Planned Therapy Interventions (OT) ADL retraining;bed mobility training;transfer training   Clinical Decision Making Complexity (OT) moderate complexity   Risk & Benefits of therapy have been explained evaluation/treatment results reviewed;patient;spouse/significant other   OT Total Evaluation Time   OT Eval, Moderate Complexity Minutes (13536) 10   OT Goals   Therapy Frequency (OT) 2 times/day   OT Predicted Duration/Target Date for Goal Attainment 06/19/23   OT Goals Lower Body Dressing;Bed Mobility;Transfers;Toilet Transfer/Toileting   OT: Lower Body Dressing Minimal assist;using adaptive equipment;within precautions   OT: Bed Mobility Modified independent;supine to/from sitting;rolling;bridging;within precautions   OT: Transfer Supervision/stand-by assist;with assistive device;within precautions  (walkin shower)   OT: Toilet Transfer/Toileting  Modified independent;toilet transfer;cleaning and garment management;within precautions   Interventions   Interventions Quick Adds Self-Care/Home Management   Self-Care/Home Management   Self-Care/Home Mgmt/ADL, Compensatory, Meal Prep Minutes (90560) 33   Symptoms Noted During/After Treatment (Meal Preparation/Planning Training) increased pain   Treatment Detail/Skilled Intervention Pt edu on spinal px - pt verbalized understanding. Pt edu on log roll technique for bed mobility - completed x3 w/ Min A. Difficulty w/ rolling L/R and supine>sitting. Pt STS w/ SBA w/ FWW and amb. 15 ft to BR. Pt edu on safety technique for walkin shower transfer - pt completed CGA - recommended pt use shower chair at home which pt has. Pt edu on safe RTS transfer - completed  w/ SBA. Edu handout given on ADLs w/in spinal px. Pt ended session in recliner.   OT Discharge Planning   OT Plan 6/19: bed mobility, RTS/walkin shower transfer, LE dressing w/ AE, car transfers, g/h   OT Discharge Recommendation (DC Rec) (S)  home with home care occupational therapy;home with assist   OT Rationale for DC Rec Pt having pain but is tolerating therapy. Good setup at home and  can assist w/ light activities if needed. Pt has all necessary DME - may benefit from a bed rail. Pt may progress to home w/ assist; currently recommending home care to facilitate safe transition home w/ all ADLs.   OT Brief overview of current status CGA for transfers, Min A bed mobility   Total Session Time   Timed Code Treatment Minutes 33   Total Session Time (sum of timed and untimed services) 43

## 2023-06-15 NOTE — PLAN OF CARE
Problem: Plan of Care - These are the overarching goals to be used throughout the patient stay.    Goal: Absence of Hospital-Acquired Illness or Injury  Intervention: Identify and Manage Fall Risk  Recent Flowsheet Documentation  Taken 6/15/2023 1105 by Sari Crook RN  Safety Promotion/Fall Prevention: safety round/check completed  Taken 6/15/2023 0905 by Sari Crook RN  Safety Promotion/Fall Prevention: safety round/check completed  Taken 6/15/2023 0830 by Sari Crook RN  Safety Promotion/Fall Prevention:    activity supervised    assistive device/personal items within reach    clutter free environment maintained    lighting adjusted    nonskid shoes/slippers when out of bed  Taken 6/15/2023 0700 by Sari Crook RN  Safety Promotion/Fall Prevention: safety round/check completed     Patient vital signs are at baseline: Yes  Patient able to ambulate as they were prior to admission or with assist devices provided by therapies during their stay:  Yes  Patient MUST void prior to discharge:  Yes  Patient able to tolerate oral intake:  Yes  Pain has adequate pain control using Oral analgesics:  Yes  Does patient have an identified :  Yes  Has goal D/C date and time been discussed with patient:  Yes    Patient A&O, VSS, and CMS intact. Dressing changed today, CDI. Pain has been manageable with medication. Ambulating with a of 1 and walker. Voiding and tolerating oral intake. All alarms in use and call light appropriate.  Sari Crook RN

## 2023-06-15 NOTE — PROGRESS NOTES
"Orthopedic Progress Note      Assessment: 1 Day Post-Op  S/P Procedure(s):  AND BILATERAL LUMBAR 2-3 LAMINECTOMY WITH AUTOGRAFT  BILATERAL INSTRUMENTED FUSION LUMBAR 3-4     Plan:   - Continue PT/OT  - Weightbearing status: WBAT can use brace for comfort  - No bending, twisting or lifting more than 10 pounds for 6 weeks.  - Anticoagulation: no chemical prophylaxis in addition to SCDs, michelle stockings and early ambulation.  - Pain control at discharge: Oxycodone 5 mg 1-2 tabs Q4-6 hours x30-32 tabs, Hydroxyzine 25 mg QID PRN, Gabapentin 100 mg TID , Tizanidine 4 mg TID, Acetaminophen 1000 mg TID  - Discharge planning:  BM, pain control and progression in therapy reelvatuate leg pain      Subjective:  Pain: mild to moderate  Chest pain, SOB: no  Nausea, Vomiting:  no  Lightheadedness, Dizziness:  no  Neuro:  Patient denies new onset numbness or paresthesias    Patient is doing well today.  She notes prior surgery for bilateral leg sciatica, likely improved since surgery.  Right leg still giving her somewhat some grief she notes that it is still occurring when she is laying flat she gets this symptoms.  When she is moving and sitting this is absent.    Objective:  /55 (BP Location: Right arm)   Pulse 75   Temp 98  F (36.7  C) (Oral)   Resp 16   Ht 1.6 m (5' 3\")   Wt 91 kg (200 lb 9.6 oz)   SpO2 97%   BMI 35.53 kg/m    The patient is A&Ox3. Appears comfortable.   Sensation is intact.  Dorsiflexion and plantar flexion is intact.  Dorsalis pedis pulse intact.  Calves are soft and non-tender. Negative Jennifer's.  The incision is covered. Dressing C/D/I    Pertinent Labs   Lab Results: personally reviewed.   No results found for: INR, PROTIME  Lab Results   Component Value Date    WBC 10.4 01/09/2019    HGB 13.5 01/09/2019    HCT 40.3 01/09/2019     (H) 01/09/2019     01/09/2019     No results found for: NA, CO2      Report completed by:  Nishi Perez PA-C/Dr. Ez Pinto " Orthopedics    Date: 6/15/2023  Time: 11:49 AM

## 2023-06-15 NOTE — PROGRESS NOTES
[facility]   HOSPITALIST CONSULT   FOLLOW UP NOTE     Summary: 83 year old female into [facility] on 6/14/2023 after presenting for an elective  Bilateral laminectomy with fusion at L3/4.     Hospital day number1    Problem list:    1.  POD #1 L2/3 laminectomy:  2.  DM2, noninsulin: metformin, fingersticks, sitagliptin  3.  Hypothyroidism: synthroid  4.  Neuropathy/chronic pain:  5.  COPD: combivent  6. GERD: prilosec  7.  Allergies: isatu Rojas MD  [facility]  [facility]  [facility]  Phone: #[phone]  Securely message with the Vocera Web Console (learn more here)  Text page via What's Trending Paging/Directory     Interval History/Subjective: stable overnight; chart reviewed    Physical Exam/Objective:  Temp:  [97.5  F (36.4  C)-98  F (36.7  C)] 98  F (36.7  C)  Pulse:  [63-81] 75  Resp:  [16-18] 16  BP: (110-137)/(55-69) 110/55  SpO2:  [85 %-97 %] 97 %  Body mass index is 35.53 kg/m .    GENERAL:  Alert, appears comfortable, in no acute distress, appears stated age   HEAD:  Normocephalic, without obvious abnormality, atraumatic   EYES:  PERRL, conjunctiva/corneas clear, no scleral icterus, EOM's intact   NOSE: Nares normal, septum midline, mucosa normal, no drainage   THROAT: Lips, mucosa, and tongue normal; teeth and gums normal, mouth moist   NECK: Supple, symmetrical, trachea midline   BACK:   Symmetric, no curvature, ROM normal   LUNGS:   Clear to auscultation bilaterally, no rales, rhonchi, or wheezing, symmetric chest rise on inhalation, respirations unlabored   CHEST WALL:  No tenderness or deformity   HEART:  Regular rate and rhythm, S1 and S2 normal, no murmur, rub, or gallop    ABDOMEN:   Soft, non-tender, bowel sounds active all four quadrants, no masses, no organomegaly, no rebound or guarding   EXTREMITIES: Extremities normal, atraumatic, no cyanosis or edema    SKIN: Dry to touch, no exanthems in the visualized areas   NEURO: Alert,  oriented x3, moves all four extremities freely   PSYCH: Cooperative, behavior is appropriate      Data reviewed today: I personally reviewed all new medications, labs, imaging/diagnostics reports over the past 24 hours. Pertinent findings include:    Imaging:   No results found for this or any previous visit (from the past 24 hour(s)).    Labs:  Most Recent 3 BMP's:Recent Labs   Lab Test 06/15/23  1252 06/15/23  0609 06/14/23  2145   * 121* 206*       Medications:   Personally Reviewed.  Medications     sodium chloride 125 mL/hr at 06/14/23 1359       acetaminophen  975 mg Oral Q8H     cyanocobalamin  1,000 mcg Sublingual Daily     fexofenadine  180 mg Oral Daily     gabapentin  300 mg Oral At Bedtime     insulin aspart  1-7 Units Subcutaneous TID AC     insulin aspart  1-5 Units Subcutaneous At Bedtime     levothyroxine  125 mcg Oral Daily     metFORMIN  500 mg Oral BID w/meals     multivitamin, therapeutic  1 tablet Oral Daily     pantoprazole  40 mg Oral Daily     polyethylene glycol  17 g Oral Daily     senna-docusate  1 tablet Oral BID     sitagliptin  100 mg Oral Daily     tolterodine ER  4 mg Oral Daily     Vitamin D3  50 mcg Oral Daily

## 2023-06-16 ENCOUNTER — APPOINTMENT (OUTPATIENT)
Dept: OCCUPATIONAL THERAPY | Facility: CLINIC | Age: 83
DRG: 460 | End: 2023-06-16
Attending: ORTHOPAEDIC SURGERY
Payer: COMMERCIAL

## 2023-06-16 ENCOUNTER — APPOINTMENT (OUTPATIENT)
Dept: PHYSICAL THERAPY | Facility: CLINIC | Age: 83
DRG: 460 | End: 2023-06-16
Attending: ORTHOPAEDIC SURGERY
Payer: COMMERCIAL

## 2023-06-16 LAB
ANION GAP SERPL CALCULATED.3IONS-SCNC: 10 MMOL/L (ref 5–18)
BUN SERPL-MCNC: 9 MG/DL (ref 8–28)
CALCIUM SERPL-MCNC: 9.9 MG/DL (ref 8.5–10.5)
CHLORIDE BLD-SCNC: 97 MMOL/L (ref 98–107)
CO2 SERPL-SCNC: 30 MMOL/L (ref 22–31)
CREAT SERPL-MCNC: 0.66 MG/DL (ref 0.6–1.1)
ERYTHROCYTE [DISTWIDTH] IN BLOOD BY AUTOMATED COUNT: 12.6 % (ref 10–15)
GFR SERPL CREATININE-BSD FRML MDRD: 88 ML/MIN/1.73M2
GLUCOSE BLD-MCNC: 153 MG/DL (ref 70–125)
GLUCOSE BLDC GLUCOMTR-MCNC: 140 MG/DL (ref 70–99)
GLUCOSE BLDC GLUCOMTR-MCNC: 151 MG/DL (ref 70–99)
GLUCOSE BLDC GLUCOMTR-MCNC: 159 MG/DL (ref 70–99)
HCT VFR BLD AUTO: 36.7 % (ref 35–47)
HGB BLD-MCNC: 11.9 G/DL (ref 11.7–15.7)
MCH RBC QN AUTO: 34.4 PG (ref 26.5–33)
MCHC RBC AUTO-ENTMCNC: 32.4 G/DL (ref 31.5–36.5)
MCV RBC AUTO: 106 FL (ref 78–100)
PLATELET # BLD AUTO: 164 10E3/UL (ref 150–450)
POTASSIUM BLD-SCNC: 4.3 MMOL/L (ref 3.5–5)
RBC # BLD AUTO: 3.46 10E6/UL (ref 3.8–5.2)
SODIUM SERPL-SCNC: 137 MMOL/L (ref 136–145)
WBC # BLD AUTO: 11.3 10E3/UL (ref 4–11)

## 2023-06-16 PROCEDURE — 250N000013 HC RX MED GY IP 250 OP 250 PS 637: Performed by: NURSE PRACTITIONER

## 2023-06-16 PROCEDURE — 97530 THERAPEUTIC ACTIVITIES: CPT | Mod: GP

## 2023-06-16 PROCEDURE — 97116 GAIT TRAINING THERAPY: CPT | Mod: GP

## 2023-06-16 PROCEDURE — 120N000001 HC R&B MED SURG/OB

## 2023-06-16 PROCEDURE — 85027 COMPLETE CBC AUTOMATED: CPT | Performed by: EMERGENCY MEDICINE

## 2023-06-16 PROCEDURE — 250N000013 HC RX MED GY IP 250 OP 250 PS 637: Performed by: EMERGENCY MEDICINE

## 2023-06-16 PROCEDURE — 250N000011 HC RX IP 250 OP 636: Performed by: PHYSICIAN ASSISTANT

## 2023-06-16 PROCEDURE — 250N000013 HC RX MED GY IP 250 OP 250 PS 637: Performed by: PHYSICIAN ASSISTANT

## 2023-06-16 PROCEDURE — 80048 BASIC METABOLIC PNL TOTAL CA: CPT | Performed by: EMERGENCY MEDICINE

## 2023-06-16 PROCEDURE — 97110 THERAPEUTIC EXERCISES: CPT | Mod: GP

## 2023-06-16 PROCEDURE — 97535 SELF CARE MNGMENT TRAINING: CPT | Mod: GO

## 2023-06-16 PROCEDURE — 250N000011 HC RX IP 250 OP 636: Performed by: NURSE PRACTITIONER

## 2023-06-16 PROCEDURE — 99232 SBSQ HOSP IP/OBS MODERATE 35: CPT | Performed by: EMERGENCY MEDICINE

## 2023-06-16 PROCEDURE — 99222 1ST HOSP IP/OBS MODERATE 55: CPT | Performed by: NURSE PRACTITIONER

## 2023-06-16 PROCEDURE — 36415 COLL VENOUS BLD VENIPUNCTURE: CPT | Performed by: EMERGENCY MEDICINE

## 2023-06-16 RX ORDER — HYDROXYZINE HYDROCHLORIDE 10 MG/1
10 TABLET, FILM COATED ORAL EVERY 6 HOURS PRN
Qty: 30 TABLET | Refills: 0 | Status: SHIPPED | OUTPATIENT
Start: 2023-06-16

## 2023-06-16 RX ORDER — HYDROMORPHONE HCL IN WATER/PF 6 MG/30 ML
0.4 PATIENT CONTROLLED ANALGESIA SYRINGE INTRAVENOUS EVERY 4 HOURS PRN
Status: DISCONTINUED | OUTPATIENT
Start: 2023-06-16 | End: 2023-06-16

## 2023-06-16 RX ORDER — HYDROMORPHONE HYDROCHLORIDE 2 MG/1
2-4 TABLET ORAL EVERY 4 HOURS PRN
Status: DISCONTINUED | OUTPATIENT
Start: 2023-06-16 | End: 2023-06-19 | Stop reason: HOSPADM

## 2023-06-16 RX ORDER — HYDROXYZINE HYDROCHLORIDE 10 MG/1
10 TABLET, FILM COATED ORAL 3 TIMES DAILY
Status: DISCONTINUED | OUTPATIENT
Start: 2023-06-16 | End: 2023-06-19 | Stop reason: HOSPADM

## 2023-06-16 RX ORDER — ACETAMINOPHEN 325 MG/1
975 TABLET ORAL EVERY 8 HOURS
Qty: 100 TABLET | Refills: 0 | Status: SHIPPED | OUTPATIENT
Start: 2023-06-16

## 2023-06-16 RX ORDER — AMOXICILLIN 250 MG
1 CAPSULE ORAL 2 TIMES DAILY
Qty: 60 TABLET | Refills: 0 | Status: SHIPPED | OUTPATIENT
Start: 2023-06-16

## 2023-06-16 RX ORDER — LIDOCAINE 50 MG/G
OINTMENT TOPICAL 4 TIMES DAILY
Status: DISCONTINUED | OUTPATIENT
Start: 2023-06-16 | End: 2023-06-19 | Stop reason: HOSPADM

## 2023-06-16 RX ORDER — METHOCARBAMOL 500 MG/1
500 TABLET, FILM COATED ORAL 4 TIMES DAILY
Status: DISCONTINUED | OUTPATIENT
Start: 2023-06-16 | End: 2023-06-19 | Stop reason: HOSPADM

## 2023-06-16 RX ORDER — HYDROMORPHONE HCL IN WATER/PF 6 MG/30 ML
0.2 PATIENT CONTROLLED ANALGESIA SYRINGE INTRAVENOUS EVERY 4 HOURS PRN
Status: DISCONTINUED | OUTPATIENT
Start: 2023-06-16 | End: 2023-06-17 | Stop reason: ALTCHOICE

## 2023-06-16 RX ORDER — OXYCODONE HYDROCHLORIDE 5 MG/1
5 TABLET ORAL EVERY 4 HOURS PRN
Qty: 30 TABLET | Refills: 0 | Status: SHIPPED | OUTPATIENT
Start: 2023-06-16 | End: 2023-06-19

## 2023-06-16 RX ADMIN — HYDROMORPHONE HYDROCHLORIDE 0.4 MG: 0.2 INJECTION, SOLUTION INTRAMUSCULAR; INTRAVENOUS; SUBCUTANEOUS at 05:44

## 2023-06-16 RX ADMIN — HYDROMORPHONE HYDROCHLORIDE 4 MG: 2 TABLET ORAL at 23:46

## 2023-06-16 RX ADMIN — HYDROXYZINE HYDROCHLORIDE 10 MG: 10 TABLET ORAL at 21:21

## 2023-06-16 RX ADMIN — ONDANSETRON 4 MG: 2 INJECTION INTRAMUSCULAR; INTRAVENOUS at 05:44

## 2023-06-16 RX ADMIN — Medication 50 MCG: at 08:21

## 2023-06-16 RX ADMIN — Medication 1000 MCG: at 08:22

## 2023-06-16 RX ADMIN — OXYCODONE HYDROCHLORIDE 10 MG: 5 TABLET ORAL at 10:11

## 2023-06-16 RX ADMIN — SENNOSIDES AND DOCUSATE SODIUM 1 TABLET: 50; 8.6 TABLET ORAL at 08:22

## 2023-06-16 RX ADMIN — METFORMIN ER 500 MG 500 MG: 500 TABLET ORAL at 16:04

## 2023-06-16 RX ADMIN — PANTOPRAZOLE SODIUM 40 MG: 40 TABLET, DELAYED RELEASE ORAL at 08:21

## 2023-06-16 RX ADMIN — HYDROMORPHONE HYDROCHLORIDE 4 MG: 2 TABLET ORAL at 14:59

## 2023-06-16 RX ADMIN — ACETAMINOPHEN 975 MG: 325 TABLET ORAL at 21:20

## 2023-06-16 RX ADMIN — METHOCARBAMOL TABLETS 500 MG: 500 TABLET, COATED ORAL at 16:04

## 2023-06-16 RX ADMIN — LEVOTHYROXINE SODIUM 125 MCG: 125 TABLET ORAL at 06:58

## 2023-06-16 RX ADMIN — FEXOFENADINE HYDROCHLORIDE 180 MG: 180 TABLET ORAL at 08:22

## 2023-06-16 RX ADMIN — INSULIN ASPART 1 UNITS: 100 INJECTION, SOLUTION INTRAVENOUS; SUBCUTANEOUS at 06:58

## 2023-06-16 RX ADMIN — SENNOSIDES AND DOCUSATE SODIUM 1 TABLET: 50; 8.6 TABLET ORAL at 21:21

## 2023-06-16 RX ADMIN — HYDROMORPHONE HYDROCHLORIDE 4 MG: 2 TABLET ORAL at 18:57

## 2023-06-16 RX ADMIN — METFORMIN ER 500 MG 500 MG: 500 TABLET ORAL at 08:22

## 2023-06-16 RX ADMIN — ACETAMINOPHEN 975 MG: 325 TABLET ORAL at 13:14

## 2023-06-16 RX ADMIN — THERA TABS 1 TABLET: TAB at 08:22

## 2023-06-16 RX ADMIN — GABAPENTIN 300 MG: 300 CAPSULE ORAL at 21:23

## 2023-06-16 RX ADMIN — ACETAMINOPHEN 975 MG: 325 TABLET ORAL at 06:58

## 2023-06-16 RX ADMIN — HYDROXYZINE HYDROCHLORIDE 10 MG: 10 TABLET ORAL at 13:15

## 2023-06-16 RX ADMIN — METHOCARBAMOL TABLETS 500 MG: 500 TABLET, COATED ORAL at 21:21

## 2023-06-16 RX ADMIN — TOLTERODINE 4 MG: 2 CAPSULE, EXTENDED RELEASE ORAL at 08:22

## 2023-06-16 RX ADMIN — SITAGLIPTIN 100 MG: 100 TABLET, FILM COATED ORAL at 08:22

## 2023-06-16 ASSESSMENT — ACTIVITIES OF DAILY LIVING (ADL)
CHANGE_IN_FUNCTIONAL_STATUS_SINCE_ONSET_OF_CURRENT_ILLNESS/INJURY: YES
ADLS_ACUITY_SCORE: 44
WEAR_GLASSES_OR_BLIND: NO
TRANSFERRING: 0-->INDEPENDENT
ADLS_ACUITY_SCORE: 43
ADLS_ACUITY_SCORE: 44
FALL_HISTORY_WITHIN_LAST_SIX_MONTHS: NO
ADLS_ACUITY_SCORE: 43
TOILETING_ISSUES: NO
DIFFICULTY_EATING/SWALLOWING: NO
ADLS_ACUITY_SCORE: 44
DRESSING/BATHING_DIFFICULTY: NO
DOING_ERRANDS_INDEPENDENTLY_DIFFICULTY: NO
WALKING_OR_CLIMBING_STAIRS_DIFFICULTY: YES
ADLS_ACUITY_SCORE: 44
ADLS_ACUITY_SCORE: 44
CONCENTRATING,_REMEMBERING_OR_MAKING_DECISIONS_DIFFICULTY: NO
HEARING_DIFFICULTY_OR_DEAF: NO
DEPENDENT_IADLS:: INDEPENDENT
DIFFICULTY_COMMUNICATING: NO
ADLS_ACUITY_SCORE: 26
TRANSFERRING: 0-->INDEPENDENT
WALKING_OR_CLIMBING_STAIRS: AMBULATION DIFFICULTY, REQUIRES EQUIPMENT
ADLS_ACUITY_SCORE: 22
ADLS_ACUITY_SCORE: 22
ADLS_ACUITY_SCORE: 26
ADLS_ACUITY_SCORE: 22

## 2023-06-16 NOTE — PROGRESS NOTES
[facility]   HOSPITALIST CONSULT   FOLLOW UP NOTE     Summary: 83 year old female into [facility] on 6/14/2023 after presenting for an elective  Bilateral laminectomy with fusion at L3/4.     Overnight she complains of nagging pain in the right leg.  Meds reviewed. Pain  Consulted for recs to optimize regimen.  Labs pending.  No discharge today as pain needs  Improved managment    Hospital day number 2    Problem list:    1.  POD #1 L2/3 laminectomy: per ortho; pt not progressing clinically due to less than ideally   Controlled pain; pain consult input appreciated;   2.  DM2, noninsulin: metformin, fingersticks, sitagliptin; glucose readings reviewed  3.  Hypothyroidism: synthroid  4.  Neuropathy/chronic pain:  neurontin 300 mg at night; consider increasing dose  5.  COPD: combivent, continue  6. GERD: prilosec  7.  Allergies: allegra  8.  Post op pain:  Current regimen includes scheduled tylenol, neurontin 300 mg at night; continue   Oxycodone every 4-6 hours as needed; titrate iv dilaudid to off, she only had 1 dose iv dilaudid   Yesterday and 1 oral dose today; not high narcotic needs;   9.  Disposition:  Home versus TCU        Yumiko Rojas MD  [facility]  [facility]  [facility]  Phone: #[phone]  Securely message with the Vocera Web Console (learn more here)  Text page via [employer] Paging/Directory     Interval History/Subjective: stable overnight; chart reviewed    Physical Exam/Objective:  Temp:  [98.1  F (36.7  C)-98.5  F (36.9  C)] 98.1  F (36.7  C)  Pulse:  [76-81] 76  Resp:  [16] 16  BP: ()/(50-55) 97/50  SpO2:  [90 %-92 %] 90 %  Body mass index is 35.53 kg/m .    GENERAL:  Alert, appears comfortable, in no acute distress, appears stated age   HEAD:  Normocephalic, without obvious abnormality, atraumatic   EYES:  PERRL, conjunctiva/corneas clear, no scleral icterus, EOM's intact   NOSE: Nares normal, septum midline, mucosa normal, no drainage    THROAT: Lips, mucosa, and tongue normal; teeth and gums normal, mouth moist   NECK: Supple, symmetrical, trachea midline   BACK:   Symmetric, no curvature, ROM normal   LUNGS:   Clear to auscultation bilaterally, no rales, rhonchi, or wheezing, symmetric chest rise on inhalation, respirations unlabored   CHEST WALL:  No tenderness or deformity   HEART:  Regular rate and rhythm, S1 and S2 normal, no murmur, rub, or gallop    ABDOMEN:   Soft, non-tender, bowel sounds active all four quadrants, no masses, no organomegaly, no rebound or guarding   EXTREMITIES: Extremities normal, atraumatic, no cyanosis or edema    SKIN: Dry to touch, no exanthems in the visualized areas   NEURO: Alert, oriented x3, moves all four extremities freely   PSYCH: Cooperative, behavior is appropriate      Data reviewed today: I personally reviewed all new medications, labs, imaging/diagnostics reports over the past 24 hours. Pertinent findings include:    Imaging:   No results found for this or any previous visit (from the past 24 hour(s)).    Labs:  Most Recent 3 BMP's:  Recent Labs   Lab Test 06/16/23  0624 06/15/23  2141 06/15/23  1706   * 158* 180*       Medications:   Personally Reviewed.  Medications     sodium chloride 125 mL/hr at 06/14/23 1359       acetaminophen  975 mg Oral Q8H     cyanocobalamin  1,000 mcg Sublingual Daily     fexofenadine  180 mg Oral Daily     gabapentin  300 mg Oral At Bedtime     insulin aspart  1-7 Units Subcutaneous TID AC     insulin aspart  1-5 Units Subcutaneous At Bedtime     levothyroxine  125 mcg Oral Daily     lidocaine   Topical 4x Daily     metFORMIN  500 mg Oral BID w/meals     methocarbamol  500 mg Oral 4x Daily     multivitamin, therapeutic  1 tablet Oral Daily     pantoprazole  40 mg Oral Daily     polyethylene glycol  17 g Oral Daily     senna-docusate  1 tablet Oral BID     sitagliptin  100 mg Oral Daily     tolterodine ER  4 mg Oral Daily     Vitamin D3  50 mcg Oral Daily

## 2023-06-16 NOTE — CONSULTS
[facility] ACUTE PAIN SERVICE CONSULTATION     Date of Admission:  6/14/2023  Date of Consult (When I saw the patient): 06/16/23  Physician requesting consult: Nishi Perez   Reason for consult: post op spine pain  Primary Care Physician: Freddy Duarte MD     Assessment/Plan:     Saima Posadas is a 83 year old female who was admitted on 6/14/2023.  Pain team was asked to see the patient for post op spine pain. Admitted for planned surgery, POD 2 BILATERAL LUMBAR 2-3 LAMINECTOMY WITH AUTOGRAFT BILATERAL INSTRUMENTED FUSION LUMBAR 3-4. History of DM2, hypothyroid, neuropathy/chronic pain, copd, GERD, allergies.  Describes pain as 7-9/10 and aching in low back and it does radiate to RLE. The patient does not smoke and denies chemical dependency history.     Post op day: 2 Days Post-Op. In 24 hours, has utilized 0.6mg of IV Dilaudid and 15mg of oxycodone for MME = 35.    Opioid Induced Respiratory Depression Risk Assessment:?high: age, post op, obesity    PLAN:   1) Pain is consistent with postop pain. Labs and imaging indicated: I have personally reviewed pertinent notes, labs, tests, and radiologic imaging in patient's chart. Treatment plan includes multimodal pain approach, Hospital Medicine Service for medical management, ortho, pt, ot. Patient educated regarding multimodal pain approach, medications as listed below. Patient is understanding of the plan. All questions and concerns addressed to patient's satisfaction.   2)Multimodal Medication Therapy  Topical: : add lidocaine ointment qid   NSAID'S: CrCl unknown - will order labs. None, post op fusion   Steroids: none - defer to surgery   Muscle Relaxants: add robaxin qid   Adjuvants: APAP 975mg q8h, gabapentin 300mg at bedtime, Atarax 10mg q6hprn  Opioids: oxycodone 5-10mg q4hprn - discontinue and try PO dilaudid - first line   IV Pain medication: IV Dilaudid 0.2-0.4mg q2h prn - change to q4h prn - second line   3)Non-medication interventions: ice,  rest, pt, ot  4)Constipation Prophylaxis: Scheduled and prn: miralax, SennaS    -Opioid prescriber has been none  -[place]  pulled from system on 6/16/23. This indicates   Gabapentin only. Last 1/27/23, 300mg #270 for 90 days    Discharge Recommendations - We recommend prescribing the following at the time of discharge: scripts per ortho.      History of Present Illness (HPI):       Saima Posadas is a 83 year old female who presented for planned surgery.  Past medical history as above. The pain is reported to be acute on chronic , located in the low back, and it does radiate to RLE.  Current pain is rated at 7-9/10 and goal is 2-4/10.      Per [place]  review, the patient does not have an opioid tolerance. Opioid induced side effects noted and include: none     Reviewed medical record, labs, imaging, ED note, and care everywhere.     Past pain treatments have included: gabapentin, APAP    Home pain medications/psych medications/anticoagulation medications include: gabapentin     Last UDS: none recent     Medical History   PAST MEDICAL HISTORY:   Past Medical History:   Diagnosis Date     Diabetes (H)      Gastroesophageal reflux disease      Thyroid disease        PAST SURGICAL HISTORY:   Past Surgical History:   Procedure Laterality Date     BREAST CYST INCISION AND DRAINAGE       DECOMPRESSION, FUSION LUMBAR POSTERIOR ONE LEVEL, COMBINED N/A 6/14/2023    Procedure: BILATERAL INSTRUMENTED FUSION LUMBAR 3-4;  Surgeon: Poncho Hui MD;  Location: [facility] OR     DILATION AND CURETTAGE       LAMINECTOMY LUMBAR ONE LEVEL Bilateral 6/14/2023    Procedure: AND BILATERAL LUMBAR 2-3 LAMINECTOMY WITH AUTOGRAFT;  Surgeon: Poncho Hiu MD;  Location: [facility] OR     MASTECTOMY Left      RELEASE CARPAL TUNNEL       SEPTOPLASTY       TUBAL LIGATION       UVULECTOMY       ZZC TOTAL KNEE ARTHROPLASTY Right 1/16/2017    Procedure:  RIGHT TOTAL KNEE  ARTHROPLASTY;  Surgeon: Daniel Mckinney MD;  Location: [facility]  Main OR;  Service: Orthopedics       FAMILY HISTORY: History reviewed. No pertinent family history.    SOCIAL HISTORY:   Social History     Tobacco Use     Smoking status: Former     Types: Cigarettes     Quit date: 2005     Years since quittin.4     Smokeless tobacco: Not on file   Vaping Use     Vaping status: Not on file   Substance Use Topics     Alcohol use: Yes     Alcohol/week: 1.0 standard drink of alcohol     Comment: Alcoholic Drinks/day: daily        HEALTH & LIFESTYLE PRACTICES  Tobacco:  reports that she quit smoking about 18 years ago. Her smoking use included cigarettes. She does not have any smokeless tobacco history on file.  Alcohol:  reports current alcohol use of about 1.0 standard drink of alcohol per week.  Illicit drugs:  reports no history of drug use.    Allergies  Allergies   Allergen Reactions     Aspirin Unknown     Nose bleeds     Ipratropium-Albuterol Unknown and Other (See Comments)     na       Other Environmental Allergy Other (See Comments)     Sneezing, itchy, watery eyes       Problem List  Patient Active Problem List    Diagnosis Date Noted     Lumbar spinal stenosis 2023     Priority: Medium     Rotator cuff dysfunction      Priority: Medium     Status post right knee replacement 2017     Priority: Medium     Diabetes mellitus type 2 in obese (H)      Priority: Medium     Lumbar radiculopathy      Priority: Medium     Acne rosacea      Priority: Medium       Prior to Admission Medications   Medications Prior to Admission   Medication Sig Dispense Refill Last Dose     acetaminophen (TYLENOL) 500 MG tablet Take 500 mg by mouth 2 times daily   2023 at 0400     cholecalciferol, vitamin D3, (VITAMIN D3) 2,000 unit cap [CHOLECALCIFEROL, VITAMIN D3, (VITAMIN D3) 2,000 UNIT CAP] Take 1 capsule by mouth daily.   2023     cyanocobalamin (VITAMIN B-12) 500 MCG SUBL sublingual tablet Place 500 mcg under the tongue daily   2023      "diphenhydrAMINE-acetaminophen (TYLENOL PM EXTRA STRENGTH)  mg Tab [DIPHENHYDRAMINE-ACETAMINOPHEN (TYLENOL PM EXTRA STRENGTH)  MG TAB] Take 1 tablet by mouth bedtime as needed.   prn     fexofenadine (ALLEGRA) 180 MG tablet [FEXOFENADINE (ALLEGRA) 180 MG TABLET] Take 180 mg by mouth daily.   6/13/2023     gabapentin (NEURONTIN) 300 MG capsule Take 300 mg by mouth At Bedtime   6/13/2023     ipratropium-albuterol (COMBIVENT RESPIMAT)  MCG/ACT inhaler Inhale 1 puff into the lungs 2 times daily as needed for shortness of breath, wheezing or cough   prn at pt supplied     levothyroxine (SYNTHROID, LEVOTHROID) 125 MCG tablet [LEVOTHYROXINE (SYNTHROID, LEVOTHROID) 125 MCG TABLET] Take 125 mcg by mouth daily.   6/13/2023 at am     metFORMIN (GLUCOPHAGE-XR) 500 MG 24 hr tablet Take 500 mg by mouth 2 times daily (with meals)   6/13/2023     omeprazole (PRILOSEC) 20 MG DR capsule Take 20 mg by mouth daily   6/13/2023 at am     sitagliptin (JANUVIA) 100 MG tablet Take 100 mg by mouth daily   6/13/2023     solifenacin (VESICARE) 10 MG tablet Take 10 mg by mouth daily   6/13/2023 at am       Review of Systems  Complete ROS reviewed, unless noted in HPI, all other systems reviewed (with patient) and all others found to be negative.      Objective:     Physical Exam:  BP 97/50 (BP Location: Right arm)   Pulse 76   Temp 98.1  F (36.7  C) (Oral)   Resp 16   Ht 1.6 m (5' 3\")   Wt 91 kg (200 lb 9.6 oz)   SpO2 90%   BMI 35.53 kg/m    Weight:   Vitals:    06/14/23 0800 06/14/23 0837   Weight: 90.9 kg (200 lb 6 oz) 91 kg (200 lb 9.6 oz)      Body mass index is 35.53 kg/m .    General Appearance:  Alert, cooperative, no distress, up in chair    Head:  Normocephalic, without obvious abnormality, atraumatic   Eyes:  PERRL, conjunctiva/corneas clear, EOM's intact   ENT/Throat: Lips, mucosa, and tongue normal; teeth and gums normal   Lymph/Neck: Supple, symmetrical, trachea midline   Lungs:   Clear to auscultation " bilaterally, respirations unlabored   Cardiovascular/Heart:  Regular rate and rhythm, S1, S2 normal,no murmur, rub or gallop.    Abdomen:   Soft, non-tender, bowel sounds active all four quadrants,  no masses, no organomegaly   Musculoskeletal: Extremities normal, atraumatic   Skin: Incision covered    Neurologic: Alert and oriented X 3, Moves all 4 extremities     Psych: Affect is anxious      Imaging: Reviewed I have personally reviewed pertinent labs, tests, and radiologic imaging in patient's chart.  XR Surgery TOVA  Fluoro G/T 5 Min    Result Date: 6/14/2023  This exam was marked as non-reportable because it will not be read by a radiologist or a [facility] non-radiologist provider.     XR MAMMO CARINA UNI SCREEN RIGHT    Result Date: 6/1/2023  For Patients: As a result of the 21st Century Cures Act, medical imaging exams and procedure reports are released immediately into your electronic medical record. You may view this report before your referring provider. If you have questions, please contact your health care provider. XR MAMMO CARINA UNI SCREEN RIGHT [[id number]] CLINICAL HISTORY:  This is an asymptomatic 83 y.o. patient. INDICATION FOR EXAM: Mammogram Screening. TECHNIQUE: CC & MLO views were obtained.  This study was evaluated with the assistance of Computer-Aided Detection. Breast Tomosynthesis was used in interpretation. COMPARISON FILM: Yes 5/17/22 [facility] 4/20/21 [employer] FINDINGS:  The right breast is heterogeneously dense, which may obscure   small masses. There are no dominant masses, suspicious micro calcifications or areas of architectural distortion.      There is no radiographic evidence for malignancy.  Recommend annual mammograms. MAMMOGRAM ASSESSMENT:  ACR 1 Negative PATIENTS: You will also receive a letter with your examination results in an easy to read format.  If you have questions about your results, please contact your referring provider.      Labs: Reviewed I have personally  reviewed pertinent labs, tests, and radiologic imaging in patient's chart.  Recent Results (from the past 24 hour(s))   Glucose by meter    Collection Time: 06/15/23 12:52 PM   Result Value Ref Range    GLUCOSE BY METER POCT 134 (H) 70 - 99 mg/dL   Glucose by meter    Collection Time: 06/15/23  5:06 PM   Result Value Ref Range    GLUCOSE BY METER POCT 180 (H) 70 - 99 mg/dL   Glucose by meter    Collection Time: 06/15/23  9:41 PM   Result Value Ref Range    GLUCOSE BY METER POCT 158 (H) 70 - 99 mg/dL   Glucose by meter    Collection Time: 06/16/23  6:24 AM   Result Value Ref Range    GLUCOSE BY METER POCT 159 (H) 70 - 99 mg/dL       Total time spent 65 minutes with greater than 50% in consultation, education and coordination of care.   Also discussed with RN, Orthopedics, pharmacist.   Please see A&P for additional details of medical decision making.  Elements of Medical Decision Making as described above. High risk therapy including opioids, high risk drug therapy including oral and/or parenteral controlled substances    Thank you for this consultation.    Alie QUAN, FNP-C  Acute Care Pain Management Program  [facility] ([place], [place], [place])  Monday-Friday 8a-4p   Page via online paging system or Vocera Messaging

## 2023-06-16 NOTE — PLAN OF CARE
Problem: Plan of Care - These are the overarching goals to be used throughout the patient stay.    Goal: Optimal Comfort and Wellbeing  Intervention: Monitor Pain and Promote Comfort  Recent Flowsheet Documentation  Taken 6/16/2023 8849 by Valerie Hernadez RN  Pain Management Interventions: medication (see MAR)    Patient stated that her pain goes down to 4 or 5 after 10 mg oxycodone but doesn't last 4 hours. She currently states her pain is an 8/10 and is affecting her movement and ability to ambulate. Scheduled tylenol and prn hydroxyzine given.     Problem: Risk for Delirium  Goal: Optimal Coping  Outcome: Progressing   Alert and oriented x 4.    Problem: Orthopaedic Rehabilitation  Goal: Effective Bowel Elimination  Outcome: Progressing   Goal Outcome Evaluation:  Able to pass flatus. No BM at this point.

## 2023-06-16 NOTE — PROGRESS NOTES
"Orthopedic Progress Note      Assessment: 2 Day Post-Op  S/P Procedure(s):  AND BILATERAL LUMBAR 2-3 LAMINECTOMY WITH AUTOGRAFT  BILATERAL INSTRUMENTED FUSION LUMBAR 3-4     Plan:   - Continue PT/OT  - Weightbearing status: WBAT can use brace for comfort  - No bending, twisting or lifting more than 10 pounds for 6 weeks.  - Anticoagulation: no chemical prophylaxis in addition to SCDs, michelle stockings and early ambulation.  - Pain control at discharge: Oxycodone 5 mg 1-2 tabs Q4-6 hours x30-32 tabs, Hydroxyzine 25 mg QID PRN, Gabapentin 100 mg TID , Tizanidine 4 mg TID, Acetaminophen 1000 mg TID  - Pain team consult  - Discharge planning:  BM, pain control and progression in therapy Home with home care vs TCU      Subjective:  Pain: mild to moderate  Chest pain, SOB: no  Nausea, Vomiting:  no  Lightheadedness, Dizziness:  no  Neuro:  Patient denies new onset numbness or paresthesias    Patient is doing well today.  She notes prior surgery for bilateral leg sciatica, mostly improved since surgery.  Right leg still giving her somewhat some grief she notes that it is still occurring when she is laying flat she gets this symptoms, but feeling improved today.  When she is moving and sitting this is absent. Still having increased pain that is hard to control will consult pain. Mobilizing okay with therapy    Objective:  BP 97/50 (BP Location: Right arm)   Pulse 76   Temp 98.1  F (36.7  C) (Oral)   Resp 16   Ht 1.6 m (5' 3\")   Wt 91 kg (200 lb 9.6 oz)   SpO2 90%   BMI 35.53 kg/m    The patient is A&Ox3. Appears comfortable.   Sensation is intact.  Dorsiflexion and plantar flexion is intact.  Dorsalis pedis pulse intact.  Calves are soft and non-tender. Negative Jennifer's.  The incision is covered. Dressing C/D/I    Pertinent Labs   Lab Results: personally reviewed.   No results found for: INR, PROTIME  Lab Results   Component Value Date    WBC 10.4 01/09/2019    HGB 13.5 01/09/2019    HCT 40.3 01/09/2019     (H) " 01/09/2019     01/09/2019     No results found for: NA, CO2      Report completed by:  Nishi Perez PA-C/Dr. Hui  [facility]    Date: 6/16/2023  Time: 11:49 AM

## 2023-06-16 NOTE — CONSULTS
Care Management Initial Consult    General Information  Assessment completed with: Patient, Spouse or significant other, Patient and   Type of CM/SW Visit: Offer D/C Planning    Primary Care Provider verified and updated as needed:     Readmission within the last 30 days: no previous admission in last 30 days      Reason for Consult: discharge planning  Advance Care Planning: Advance Care Planning Reviewed: no concerns identified        Communication Assessment  Patient's communication style: spoken language (English or Bilingual)      Cognitive  Cognitive/Neuro/Behavioral: WDL  Level of Consciousness: alert  Arousal Level: arouses to voice                Living Environment:   People in home: spouse   Garret  Current living Arrangements: house      Able to return to prior arrangements:      Family/Social Support:  Care provided by: self, spouse/significant other  Provides care for: no one, unable/limited ability to care for self  Marital Status:   , Children  Garret       Description of Support System: Supportive, Involved    Support Assessment: Adequate family and caregiver support, Adequate social supports    Current Resources:   Patient receiving home care services: No     Community Resources: None  Equipment currently used at home: cane, quad, grab bar, toilet, grab bar, tub/shower, raised toilet seat, shower chair, walker, standard  Supplies currently used at home: None    Employment/Financial:  Employment Status: retired        Financial Concerns: No concerns identified     Does the patient's insurance plan have a 3 day qualifying hospital stay waiver?  No    Lifestyle & Psychosocial Needs:  Social Determinants of Health     Tobacco Use: Medium Risk (6/15/2023)    Patient History      Smoking Tobacco Use: Former      Smokeless Tobacco Use: Unknown      Passive Exposure: Not on file   Alcohol Use: Not on file   Financial Resource Strain: Not on file   Food Insecurity: Not on file  "  Transportation Needs: Not on file   Physical Activity: Not on file   Stress: Not on file   Social Connections: Not on file   Intimate Partner Violence: Not on file   Depression: Not on file   Housing Stability: Not on file     Functional Status:  Prior to admission patient needed assistance:   Dependent ADLs:: Independent  Dependent IADLs:: Independent  Assesssment of Functional Status: Not at baseline with ADL Functioning, Not at baseline with mobility, Not at  functional baseline    Mental Health Status:  Mental Health Status: No Current Concerns       Chemical Dependency Status:  Chemical Dependency Status: No Current Concerns       Values/Beliefs:  Spiritual, Cultural Beliefs, Taoist Practices, Values that affect care:               Additional Information:  1241 This writer met with patient and her  for discharge planning. Patient was A & O x 4 at time of assessment and was very willing to share info. She was a bit sleepy due to pain meds. Patient lives with her  in a house in [place]. She has been working with therapy and they are \"on the fence\" as to whether or not patient should discharge home with home health care or go to a TCU. Both were discussed with patient. She prefers to go home, her  senses she would do better with a short stay in a TCU. [facility] th past. Patient's  is familiar with [facility] as well. They are agreeable to CM making referrals to both home health care and TCU, pending final therapy recommendations. Patient does also share that her daughter is able to come and stay with them for a couple of days to assist with recovery. This writer will make initial TCU and home care referrals.    1532 Home care referrals are pending.  TCU referral status update:  [facility] declined - payor/insurance denied or not accepted  [facility] declined - cost of medications  [facility] -- could accept for a bed on " Monday, 6/19  [facility] ([facility] - interpret current therapy notes as recommending home, can reassess on Monday and would have to get insurance authorization    ALMAZ Zavala

## 2023-06-16 NOTE — PLAN OF CARE
Problem: Risk for Delirium  Goal: Improved Sleep  Outcome: Progressing     Problem: Plan of Care - These are the overarching goals to be used throughout the patient stay.    Goal: Optimal Comfort and Wellbeing  Outcome: Progressing   Goal Outcome Evaluation:       Pt alert and oriented. Calls appropriately. VSS on RA. CMS intact. Dressing CDI. Pain was uncontrolled last evening post ambulation, assist of 1 with walker and gaitbelt. Pain managed once in bed and positioned appropriately with ice and pillow support. Voiding well, external catheter in place overnight. Continue plan of care.

## 2023-06-17 ENCOUNTER — APPOINTMENT (OUTPATIENT)
Dept: PHYSICAL THERAPY | Facility: CLINIC | Age: 83
DRG: 460 | End: 2023-06-17
Attending: ORTHOPAEDIC SURGERY
Payer: COMMERCIAL

## 2023-06-17 ENCOUNTER — APPOINTMENT (OUTPATIENT)
Dept: OCCUPATIONAL THERAPY | Facility: CLINIC | Age: 83
DRG: 460 | End: 2023-06-17
Attending: ORTHOPAEDIC SURGERY
Payer: COMMERCIAL

## 2023-06-17 LAB
GLUCOSE BLDC GLUCOMTR-MCNC: 158 MG/DL (ref 70–99)
GLUCOSE BLDC GLUCOMTR-MCNC: 162 MG/DL (ref 70–99)
GLUCOSE BLDC GLUCOMTR-MCNC: 163 MG/DL (ref 70–99)
GLUCOSE BLDC GLUCOMTR-MCNC: 197 MG/DL (ref 70–99)

## 2023-06-17 PROCEDURE — 97535 SELF CARE MNGMENT TRAINING: CPT | Mod: GO

## 2023-06-17 PROCEDURE — 97116 GAIT TRAINING THERAPY: CPT | Mod: GP

## 2023-06-17 PROCEDURE — 120N000001 HC R&B MED SURG/OB

## 2023-06-17 PROCEDURE — 250N000013 HC RX MED GY IP 250 OP 250 PS 637: Performed by: PHYSICIAN ASSISTANT

## 2023-06-17 PROCEDURE — 250N000013 HC RX MED GY IP 250 OP 250 PS 637: Performed by: NURSE PRACTITIONER

## 2023-06-17 PROCEDURE — 250N000009 HC RX 250: Performed by: NURSE PRACTITIONER

## 2023-06-17 PROCEDURE — 250N000013 HC RX MED GY IP 250 OP 250 PS 637: Performed by: EMERGENCY MEDICINE

## 2023-06-17 PROCEDURE — 97530 THERAPEUTIC ACTIVITIES: CPT | Mod: GP

## 2023-06-17 PROCEDURE — 99232 SBSQ HOSP IP/OBS MODERATE 35: CPT | Performed by: EMERGENCY MEDICINE

## 2023-06-17 RX ADMIN — MAGNESIUM HYDROXIDE 30 ML: 400 SUSPENSION ORAL at 18:36

## 2023-06-17 RX ADMIN — FEXOFENADINE HYDROCHLORIDE 180 MG: 180 TABLET ORAL at 08:00

## 2023-06-17 RX ADMIN — INSULIN ASPART 2 UNITS: 100 INJECTION, SOLUTION INTRAVENOUS; SUBCUTANEOUS at 11:17

## 2023-06-17 RX ADMIN — PANTOPRAZOLE SODIUM 40 MG: 40 TABLET, DELAYED RELEASE ORAL at 08:00

## 2023-06-17 RX ADMIN — INSULIN ASPART 1 UNITS: 100 INJECTION, SOLUTION INTRAVENOUS; SUBCUTANEOUS at 16:24

## 2023-06-17 RX ADMIN — METHOCARBAMOL TABLETS 500 MG: 500 TABLET, COATED ORAL at 08:00

## 2023-06-17 RX ADMIN — Medication 1000 MCG: at 08:00

## 2023-06-17 RX ADMIN — TOLTERODINE 4 MG: 2 CAPSULE, EXTENDED RELEASE ORAL at 08:00

## 2023-06-17 RX ADMIN — HYDROXYZINE HYDROCHLORIDE 10 MG: 10 TABLET ORAL at 13:00

## 2023-06-17 RX ADMIN — METFORMIN ER 500 MG 500 MG: 500 TABLET ORAL at 08:00

## 2023-06-17 RX ADMIN — GABAPENTIN 300 MG: 300 CAPSULE ORAL at 20:13

## 2023-06-17 RX ADMIN — HYDROMORPHONE HYDROCHLORIDE 4 MG: 2 TABLET ORAL at 07:02

## 2023-06-17 RX ADMIN — HYDROXYZINE HYDROCHLORIDE 10 MG: 10 TABLET ORAL at 08:00

## 2023-06-17 RX ADMIN — LEVOTHYROXINE SODIUM 125 MCG: 125 TABLET ORAL at 07:02

## 2023-06-17 RX ADMIN — HYDROMORPHONE HYDROCHLORIDE 4 MG: 2 TABLET ORAL at 13:00

## 2023-06-17 RX ADMIN — THERA TABS 1 TABLET: TAB at 08:00

## 2023-06-17 RX ADMIN — INSULIN ASPART 1 UNITS: 100 INJECTION, SOLUTION INTRAVENOUS; SUBCUTANEOUS at 07:57

## 2023-06-17 RX ADMIN — METHOCARBAMOL TABLETS 500 MG: 500 TABLET, COATED ORAL at 16:27

## 2023-06-17 RX ADMIN — ACETAMINOPHEN 975 MG: 325 TABLET ORAL at 07:01

## 2023-06-17 RX ADMIN — METFORMIN ER 500 MG 500 MG: 500 TABLET ORAL at 16:27

## 2023-06-17 RX ADMIN — LIDOCAINE: 50 OINTMENT TOPICAL at 16:27

## 2023-06-17 RX ADMIN — SENNOSIDES AND DOCUSATE SODIUM 1 TABLET: 50; 8.6 TABLET ORAL at 08:00

## 2023-06-17 RX ADMIN — LIDOCAINE: 50 OINTMENT TOPICAL at 20:14

## 2023-06-17 RX ADMIN — SENNOSIDES AND DOCUSATE SODIUM 1 TABLET: 50; 8.6 TABLET ORAL at 20:13

## 2023-06-17 RX ADMIN — HYDROXYZINE HYDROCHLORIDE 10 MG: 10 TABLET ORAL at 20:13

## 2023-06-17 RX ADMIN — SITAGLIPTIN 100 MG: 100 TABLET, FILM COATED ORAL at 08:00

## 2023-06-17 RX ADMIN — METHOCARBAMOL TABLETS 500 MG: 500 TABLET, COATED ORAL at 20:13

## 2023-06-17 RX ADMIN — POLYETHYLENE GLYCOL 3350 17 G: 17 POWDER, FOR SOLUTION ORAL at 08:00

## 2023-06-17 RX ADMIN — METHOCARBAMOL TABLETS 500 MG: 500 TABLET, COATED ORAL at 13:00

## 2023-06-17 RX ADMIN — HYDROMORPHONE HYDROCHLORIDE 4 MG: 2 TABLET ORAL at 18:32

## 2023-06-17 RX ADMIN — Medication 50 MCG: at 08:00

## 2023-06-17 ASSESSMENT — ACTIVITIES OF DAILY LIVING (ADL)
ADLS_ACUITY_SCORE: 22
ADLS_ACUITY_SCORE: 26
ADLS_ACUITY_SCORE: 22
ADLS_ACUITY_SCORE: 26
ADLS_ACUITY_SCORE: 22
ADLS_ACUITY_SCORE: 26
ADLS_ACUITY_SCORE: 22

## 2023-06-17 NOTE — PLAN OF CARE
Goal Outcome Evaluation:      Problem: Risk for Delirium  Goal: Improved Behavioral Control  Intervention: Minimize Safety Risk    Problem: Pain Acute  Goal: Optimal Pain Control and Function  Outcome: Not Progressing     Patient vital signs are at baseline: Yes  Patient able to ambulate as they were prior to admission or with assist devices provided by therapies during their stay:  Yes  Patient MUST void prior to discharge:  Yes  Patient able to tolerate oral intake:  Yes  Pain has adequate pain control using Oral analgesics:  No,  Reason:  Pt rates pain 8/10, and states that PRN medications are not effective.   Does patient have an identified :  Yes  Has goal D/C date and time been discussed with patient:  Yes, TCU placement is require, but pt wants to be discharge home.      Pt is alert and oriented x4. Forgetful at times. VSS on RA. Pt rated moderate to serve pain during this shift. PRN Dilaudid was administered, pt stated that it was ineffective.   Pt has been alternating from bed to chair d/t pain and comfort. Pt stated that the chair is better. Pt stated that warm packs are effective for her leg pain. Pt did require an assistive 2 with gaitbelt and walker at times during this shift.

## 2023-06-17 NOTE — PROGRESS NOTES
"ORTHO POD 3    UP IN CHAIR  /54 (BP Location: Right arm)   Pulse 86   Temp 97.9  F (36.6  C) (Oral)   Resp 17   Ht 1.6 m (5' 3\")   Wt 91 kg (200 lb 9.6 oz)   SpO2 91%   BMI 35.53 kg/m      PAIN IS A BIT BETTER    PLAN - MOBILIZE   discharge PLANNING WHEN PAIN CONTROLLED  "

## 2023-06-17 NOTE — PROGRESS NOTES
"[facility]   HOSPITALIST CONSULT   FOLLOW UP NOTE     Summary: 83 year old female into [facility] on 6/14/2023 after presenting for an elective  Bilateral laminectomy with fusion at L3/4.     Overnight she complains of nagging pain in the right leg.  Meds reviewed. Pain  Consulted for recs to optimize regimen.  Labs pending.  No discharge today as pain needs  Improved managment    Hospital day number 3    Problem list:    1.  POD #2 L2/3 laminectomy: per ortho; pt states she is improved though report   Is that she had a \"bad\" night  2.  DM2, noninsulin: metformin, fingersticks, sitagliptin; glucose readings reviewed  3.  Hypothyroidism: synthroid  4.  Neuropathy/chronic pain:  neurontin 300 mg at night; consider increasing dose  5.  COPD: combivent, continue  6. GERD: prilosec  7.  Allergies: allegra  8.  Post op pain:  More stable; off iv dilaudid, on orals  9.  Disposition:  TCU arrangements        Yumiko Rojas MD  [facility]  [facility]  [facility]  Phone: #[phone]  Securely message with the Vocera Web Console (learn more here)  Text page via [employer] Paging/Directory     Interval History/Subjective: restless night    Physical Exam/Objective:  Temp:  [97.9  F (36.6  C)-98.3  F (36.8  C)] 98.3  F (36.8  C)  Pulse:  [72-92] 72  Resp:  [16-17] 16  BP: (105-118)/(53-58) 118/58  SpO2:  [91 %-92 %] 92 %  Body mass index is 35.53 kg/m .    GENERAL:  Alert, appears comfortable, in no acute distress, appears stated age   HEAD:  Normocephalic, without obvious abnormality, atraumatic   EYES:  PERRL, conjunctiva/corneas clear, no scleral icterus, EOM's intact   NOSE: Nares normal, septum midline, mucosa normal, no drainage   THROAT: Lips, mucosa, and tongue normal; teeth and gums normal, mouth moist   NECK: Supple, symmetrical, trachea midline   BACK:   Symmetric, no curvature, ROM normal   LUNGS:   Clear to auscultation bilaterally, no rales, rhonchi, or wheezing, " symmetric chest rise on inhalation, respirations unlabored   CHEST WALL:  No tenderness or deformity   HEART:  Regular rate and rhythm, S1 and S2 normal, no murmur, rub, or gallop    ABDOMEN:   Soft, non-tender, bowel sounds active all four quadrants, no masses, no organomegaly, no rebound or guarding   EXTREMITIES: Extremities normal, atraumatic, no cyanosis or edema    SKIN: Dry to touch, no exanthems in the visualized areas   NEURO: Alert, oriented x3, moves all four extremities freely   PSYCH: Cooperative, behavior is appropriate      Data reviewed today: I personally reviewed all new medications, labs, imaging/diagnostics reports over the past 24 hours. Pertinent findings include:    Imaging:   No results found for this or any previous visit (from the past 24 hour(s)).    Labs:  Most Recent 3 BMP's:  Recent Labs   Lab Test 06/17/23  1112 06/17/23  0746 06/16/23  2119 06/16/23  1708 06/16/23  1651   NA  --   --   --   --  137   POTASSIUM  --   --   --   --  4.3   CHLORIDE  --   --   --   --  97*   CO2  --   --   --   --  30   BUN  --   --   --   --  9   CR  --   --   --   --  0.66   ANIONGAP  --   --   --   --  10   PRESTON  --   --   --   --  9.9   * 162* 151*   < > 153*    < > = values in this interval not displayed.       Medications:   Personally Reviewed.  Medications       cyanocobalamin  1,000 mcg Sublingual Daily     fexofenadine  180 mg Oral Daily     gabapentin  300 mg Oral At Bedtime     hydrOXYzine  10 mg Oral TID     insulin aspart  1-7 Units Subcutaneous TID AC     insulin aspart  1-5 Units Subcutaneous At Bedtime     levothyroxine  125 mcg Oral Daily     lidocaine   Topical 4x Daily     metFORMIN  500 mg Oral BID w/meals     methocarbamol  500 mg Oral 4x Daily     multivitamin, therapeutic  1 tablet Oral Daily     pantoprazole  40 mg Oral Daily     polyethylene glycol  17 g Oral Daily     senna-docusate  1 tablet Oral BID     sitagliptin  100 mg Oral Daily     tolterodine ER  4 mg Oral Daily      Vitamin D3  50 mcg Oral Daily

## 2023-06-17 NOTE — PLAN OF CARE
Goal Outcome Evaluation:    Problem: Plan of Care - These are the overarching goals to be used throughout the patient stay.    Goal: Optimal Comfort and Wellbeing  Intervention: Monitor Pain and Promote Comfort  Recent Flowsheet Documentation  Taken 6/17/2023 0800 by Valerie Hernadez RN  Pain Management Interventions: repositioned     Problem: Orthopaedic Rehabilitation  Goal: Effective Bowel Elimination  Outcome: Progressing        Patient states that she feels much improved from yesterday. Continue to monitor.

## 2023-06-17 NOTE — PROGRESS NOTES
[facility] ACUTE PAIN SERVICE    ([facility], [facility], [facility])  Daily PAIN Progress Note    Assessment/Plan:   Saima Posadas is a 83 year old female who was admitted on 6/14/2023.  Pain team was asked to see the patient for post op spine pain. Admitted for planned surgery, POD 2 BILATERAL LUMBAR 2-3 LAMINECTOMY WITH AUTOGRAFT BILATERAL INSTRUMENTED FUSION LUMBAR 3-4. History of DM2, hypothyroid, neuropathy/chronic pain, copd, GERD, allergies.  Describes thigh pain, mostly left this morning, as 6/10, was severe last night but better this am.  She only describes mild soreness in low back. Patient feels the methocarbamol is helping, and will change gabapentin to bid vs q hs. Patient is not sedated or confused. The patient does not smoke and denies chemical dependency history.      Post op day: 3 Days Post-Op. In 24 hours, has utilized 16 mg oral Dilaudid, MME about 64 mg.    Discussed plan with Shreya Song, CNS, CNP, Acute Pain management team.        Opioid Induced Respiratory Depression Risk Assessment:?high: age, post op, obesity     PLAN:   1) Pain is consistent with postop pain. Labs and imaging indicated: I have personally reviewed pertinent notes, labs, tests, and radiologic imaging in patient's chart. Treatment plan includes multimodal pain approach, Hospital Medicine Service for medical management, ortho, pt, ot. Patient educated regarding multimodal pain approach, medications as listed below. Patient is understanding of the plan. All questions and concerns addressed to patient's satisfaction.   2)Multimodal Medication Therapy  Topical: : add lidocaine ointment qid   NSAID'S: CrCl 71 ml/min. No-due to post-fusion  iSteroids: none - defer to surgery   Muscle Relaxants: Robaxin 500 mg po qid   Adjuvants: APAP 975mg q8h, increase gabapentin to bid, she was taking it just at bedtime.  Atarax 10mg q6hprn  Opioids: Dilaudid 2-4 mg po q 4 h prn-was chagned from oxycodone on 6/16.  IV  Pain medication: IV Dilaudid 0.2-0.4mg q2h prn - change to q4h prn - second line   3)Non-medication interventions: ice, rest, pt, ot  4)Constipation Prophylaxis: Scheduled and prn: miralax, SennaS     -Opioid prescriber has been none  -MN  pulled from system on 6/16/23. This indicates   Gabapentin only. Last 1/27/23, 300mg #270 for 90 days     Discharge Recommendations - We recommend prescribing the following at the time of discharge: scripts per ortho. Most likely oral Dilaudid, methocarbamol prn x 1 week and increase in gabapentin x 1 week(will not need Rx for this unless goes to TCU)        Lumbar spinal stenosis   Patient Active Problem List   Diagnosis     Status post right knee replacement     Diabetes mellitus type 2 in obese (H)     Lumbar radiculopathy     Acne rosacea     Rotator cuff dysfunction     Lumbar spinal stenosis              Yumiko Khan, [facility]  Acute Care Pain Management Program   Hours of pain coverage 7a-1700- after 1700 please call the house officer   [employer] (Facundo PASCUAL, [place], [place], [place])   Page via [employer]- Click HERE to page Shreya or Areil text web console

## 2023-06-17 NOTE — PROGRESS NOTES
Care Management Follow Up    Length of Stay (days): 3    Expected Discharge Date: 06/17/2023     Concerns to be Addressed:     Discharge dispo    Additional Information:  CM met with patient also spoke with  on the phone. They are in agreement if TCU is needed, that they would like to go that route, knowing it may not happen until Monday. She has been accepted to [facility] for 6/19/23. Will continue to follow along.  is planning to transport.  PAS needed.      Camille Urbina RN

## 2023-06-18 ENCOUNTER — APPOINTMENT (OUTPATIENT)
Dept: OCCUPATIONAL THERAPY | Facility: CLINIC | Age: 83
DRG: 460 | End: 2023-06-18
Attending: ORTHOPAEDIC SURGERY
Payer: COMMERCIAL

## 2023-06-18 ENCOUNTER — APPOINTMENT (OUTPATIENT)
Dept: PHYSICAL THERAPY | Facility: CLINIC | Age: 83
DRG: 460 | End: 2023-06-18
Attending: ORTHOPAEDIC SURGERY
Payer: COMMERCIAL

## 2023-06-18 LAB
GLUCOSE BLDC GLUCOMTR-MCNC: 138 MG/DL (ref 70–99)
GLUCOSE BLDC GLUCOMTR-MCNC: 140 MG/DL (ref 70–99)
GLUCOSE BLDC GLUCOMTR-MCNC: 155 MG/DL (ref 70–99)
GLUCOSE BLDC GLUCOMTR-MCNC: 233 MG/DL (ref 70–99)

## 2023-06-18 PROCEDURE — 97530 THERAPEUTIC ACTIVITIES: CPT | Mod: GP

## 2023-06-18 PROCEDURE — 250N000013 HC RX MED GY IP 250 OP 250 PS 637: Performed by: PHYSICIAN ASSISTANT

## 2023-06-18 PROCEDURE — 99232 SBSQ HOSP IP/OBS MODERATE 35: CPT | Performed by: EMERGENCY MEDICINE

## 2023-06-18 PROCEDURE — 250N000013 HC RX MED GY IP 250 OP 250 PS 637: Performed by: NURSE PRACTITIONER

## 2023-06-18 PROCEDURE — 250N000013 HC RX MED GY IP 250 OP 250 PS 637: Performed by: PAIN MEDICINE

## 2023-06-18 PROCEDURE — 97116 GAIT TRAINING THERAPY: CPT | Mod: GP

## 2023-06-18 PROCEDURE — 250N000013 HC RX MED GY IP 250 OP 250 PS 637: Performed by: EMERGENCY MEDICINE

## 2023-06-18 PROCEDURE — 120N000001 HC R&B MED SURG/OB

## 2023-06-18 PROCEDURE — 97535 SELF CARE MNGMENT TRAINING: CPT | Mod: GO

## 2023-06-18 RX ORDER — GABAPENTIN 300 MG/1
300 CAPSULE ORAL 2 TIMES DAILY
Status: DISCONTINUED | OUTPATIENT
Start: 2023-06-18 | End: 2023-06-19 | Stop reason: HOSPADM

## 2023-06-18 RX ORDER — ACETAMINOPHEN 325 MG/1
975 TABLET ORAL 3 TIMES DAILY
Status: DISCONTINUED | OUTPATIENT
Start: 2023-06-18 | End: 2023-06-19 | Stop reason: HOSPADM

## 2023-06-18 RX ADMIN — TOLTERODINE 4 MG: 2 CAPSULE, EXTENDED RELEASE ORAL at 07:56

## 2023-06-18 RX ADMIN — METFORMIN ER 500 MG 500 MG: 500 TABLET ORAL at 07:53

## 2023-06-18 RX ADMIN — SITAGLIPTIN 100 MG: 100 TABLET, FILM COATED ORAL at 09:37

## 2023-06-18 RX ADMIN — GABAPENTIN 300 MG: 300 CAPSULE ORAL at 21:05

## 2023-06-18 RX ADMIN — Medication 1000 MCG: at 07:55

## 2023-06-18 RX ADMIN — METHOCARBAMOL TABLETS 500 MG: 500 TABLET, COATED ORAL at 07:55

## 2023-06-18 RX ADMIN — METFORMIN ER 500 MG 500 MG: 500 TABLET ORAL at 16:46

## 2023-06-18 RX ADMIN — ACETAMINOPHEN 650 MG: 325 TABLET ORAL at 01:40

## 2023-06-18 RX ADMIN — ACETAMINOPHEN 975 MG: 325 TABLET ORAL at 14:23

## 2023-06-18 RX ADMIN — BISACODYL 10 MG: 10 SUPPOSITORY RECTAL at 02:58

## 2023-06-18 RX ADMIN — HYDROMORPHONE HYDROCHLORIDE 4 MG: 2 TABLET ORAL at 19:01

## 2023-06-18 RX ADMIN — POLYETHYLENE GLYCOL 3350 17 G: 17 POWDER, FOR SOLUTION ORAL at 07:51

## 2023-06-18 RX ADMIN — LIDOCAINE: 50 OINTMENT TOPICAL at 21:08

## 2023-06-18 RX ADMIN — HYDROXYZINE HYDROCHLORIDE 10 MG: 10 TABLET ORAL at 14:24

## 2023-06-18 RX ADMIN — LIDOCAINE: 50 OINTMENT TOPICAL at 16:46

## 2023-06-18 RX ADMIN — METHOCARBAMOL TABLETS 500 MG: 500 TABLET, COATED ORAL at 16:46

## 2023-06-18 RX ADMIN — SENNOSIDES AND DOCUSATE SODIUM 1 TABLET: 50; 8.6 TABLET ORAL at 07:56

## 2023-06-18 RX ADMIN — GABAPENTIN 300 MG: 300 CAPSULE ORAL at 09:37

## 2023-06-18 RX ADMIN — HYDROXYZINE HYDROCHLORIDE 10 MG: 10 TABLET ORAL at 21:05

## 2023-06-18 RX ADMIN — PANTOPRAZOLE SODIUM 40 MG: 40 TABLET, DELAYED RELEASE ORAL at 07:55

## 2023-06-18 RX ADMIN — FEXOFENADINE HYDROCHLORIDE 180 MG: 180 TABLET ORAL at 07:58

## 2023-06-18 RX ADMIN — INSULIN ASPART 2 UNITS: 100 INJECTION, SOLUTION INTRAVENOUS; SUBCUTANEOUS at 16:45

## 2023-06-18 RX ADMIN — ACETAMINOPHEN 975 MG: 325 TABLET ORAL at 09:37

## 2023-06-18 RX ADMIN — Medication 50 MCG: at 07:57

## 2023-06-18 RX ADMIN — LEVOTHYROXINE SODIUM 125 MCG: 125 TABLET ORAL at 06:51

## 2023-06-18 RX ADMIN — ACETAMINOPHEN 975 MG: 325 TABLET ORAL at 21:04

## 2023-06-18 RX ADMIN — SENNOSIDES AND DOCUSATE SODIUM 1 TABLET: 50; 8.6 TABLET ORAL at 21:05

## 2023-06-18 RX ADMIN — HYDROXYZINE HYDROCHLORIDE 10 MG: 10 TABLET ORAL at 07:57

## 2023-06-18 RX ADMIN — METHOCARBAMOL TABLETS 500 MG: 500 TABLET, COATED ORAL at 14:24

## 2023-06-18 RX ADMIN — METHOCARBAMOL TABLETS 500 MG: 500 TABLET, COATED ORAL at 21:05

## 2023-06-18 RX ADMIN — HYDROMORPHONE HYDROCHLORIDE 4 MG: 2 TABLET ORAL at 05:25

## 2023-06-18 RX ADMIN — THERA TABS 1 TABLET: TAB at 07:58

## 2023-06-18 ASSESSMENT — ACTIVITIES OF DAILY LIVING (ADL)
ADLS_ACUITY_SCORE: 22
ADLS_ACUITY_SCORE: 25
ADLS_ACUITY_SCORE: 27
ADLS_ACUITY_SCORE: 25
ADLS_ACUITY_SCORE: 22
ADLS_ACUITY_SCORE: 23
ADLS_ACUITY_SCORE: 25
ADLS_ACUITY_SCORE: 25

## 2023-06-18 NOTE — PLAN OF CARE
Goal Outcome Evaluation:  On arrival to pt's room, Pt noted she was upset with pain and the inability to make a bowel movement. Pt refused narcotics at first due to the effects on the bowels, gave PRN Tylenol. Pt requested RN to give suppository. Suppository given and was not effective during this shift. Pt is passing gas. BS are active. The patient was agreeable to take PO dilaudid for back pain. Plan to have patient discharge to TCU on Monday.

## 2023-06-18 NOTE — PROGRESS NOTES
"Orthopedic Progress Note      Assessment: 4 Day Post-Op  S/P Procedure(s):  AND BILATERAL LUMBAR 2-3 LAMINECTOMY WITH AUTOGRAFT  BILATERAL INSTRUMENTED FUSION LUMBAR 3-4     Plan:   - Continue PT/OT  - Weightbearing status: WBAT can use brace for comfort  - No bending, twisting or lifting more than 10 pounds for 6 weeks.  - Anticoagulation: no chemical prophylaxis in addition to SCDs, michelle stockings and early ambulation.  - Pain control at discharge: pain team recommends discharge on oral Dilaudid, methocarbamol prn x 1 week and increase in gabapentin x 1 week(will not need Rx for this unless goes to TCU)  - Discharge planning:  BM, pain control and progression in therapy, planning to discharge to TCU on 6/19    Subjective:  Pain: mild to moderate  Chest pain, SOB: no  Nausea, Vomiting:  no  Lightheadedness, Dizziness:  no  Neuro:  Patient denies new onset numbness or paresthesias    Patient is doing well today. Pain better controlled. No BM yet but passing gas. No other concerns.    Objective:  /55 (BP Location: Right arm)   Pulse 82   Temp 97.6  F (36.4  C) (Oral)   Resp 16   Ht 1.6 m (5' 3\")   Wt 91 kg (200 lb 9.6 oz)   SpO2 96%   BMI 35.53 kg/m    The patient is A&Ox3. Appears comfortable.   Sensation is intact.  Dorsiflexion and plantar flexion is intact.  Dorsalis pedis pulse intact.  Calves are soft and non-tender. Negative Jennifer's.  The incision is covered. Dressing C/D/I    Pertinent Labs   Lab Results: personally reviewed.   No results found for: INR, PROTIME  Lab Results   Component Value Date    WBC 11.3 (H) 06/16/2023    HGB 11.9 06/16/2023    HCT 36.7 06/16/2023     (H) 06/16/2023     06/16/2023     Lab Results   Component Value Date     06/16/2023    CO2 30 06/16/2023         Report completed by: Preeti Hernandez PA-C  [facility]    "

## 2023-06-18 NOTE — PLAN OF CARE
Problem: Plan of Care - These are the overarching goals to be used throughout the patient stay.    Goal: Absence of Hospital-Acquired Illness or Injury  Intervention: Identify and Manage Fall Risk  Recent Flowsheet Documentation  Taken 6/18/2023 0900 by Valerie Hernadez, RN  Safety Promotion/Fall Prevention: activity supervised     Problem: Plan of Care - These are the overarching goals to be used throughout the patient stay.    Goal: Optimal Comfort and Wellbeing  Intervention: Monitor Pain and Promote Comfort  Recent Flowsheet Documentation  Taken 6/18/2023 1423 by Valerie Hernadez, RN  Pain Management Interventions: medication (see MAR)   Goal Outcome Evaluation:         Patient is doing well today. Stated pain is managed well. Still has difficulty lying in bed and sit to stand. Had BM today. Planning to go to TCU tomorrow.

## 2023-06-18 NOTE — PROGRESS NOTES
[facility]   HOSPITALIST CONSULT   FOLLOW UP NOTE     Summary: 83 year old female into [facility] on 6/14/2023 after presenting for an elective  Bilateral laminectomy with fusion at L3/4.   She had no complications though the post  Operative hospital course has been marked by some mild difficult to control pain.     Overnight her pain symptoms have stabilized.  MAR reviewed.  She was put on robaxain,   Gabapentin and dilaudid.     Hospital day number     Problem list:    1.  POD #2 L2/3 laminectomy: per ortho; improving  2.  DM2, noninsulin: metformin, finger sticks, sitagliptin; glucose readings reviewed  3.  Hypothyroidism: synthroid  4.  Acute on chronic pain:  Continue gabapentin, oral dilaudid, robaxin  5.  COPD: combivent, continue  6. GERD: prilosec  7.  Allergies: allegra  8.  Post op pain:  More stable; off iv dilaudid, on orals  9.  Disposition:  TCU arrangements        Yumiko Rojas MD  [facility]  [facility]  Phone: #[phone]  Securely message with the Vocera Web Console (learn more here)  Text page via [employer] Paging/Directory     Interval History/Subjective: stable overnight    Physical Exam/Objective:  Temp:  [97.6  F (36.4  C)-98.6  F (37  C)] 97.6  F (36.4  C)  Pulse:  [82-95] 82  Resp:  [16-18] 16  BP: (119-132)/(55-85) 119/55  SpO2:  [93 %-99 %] 96 %  Body mass index is 35.53 kg/m .    GENERAL:  Alert, appears comfortable, in no acute distress, appears stated age   HEAD:  Normocephalic, without obvious abnormality, atraumatic   EYES:  PERRL, conjunctiva/corneas clear, no scleral icterus, EOM's intact   NOSE: Nares normal, septum midline, mucosa normal, no drainage   THROAT: Lips, mucosa, and tongue normal; teeth and gums normal, mouth moist   NECK: Supple, symmetrical, trachea midline   BACK:   Symmetric, no curvature, ROM normal   LUNGS:   Clear to auscultation bilaterally, no rales, rhonchi, or wheezing, symmetric chest rise on  inhalation, respirations unlabored   CHEST WALL:  No tenderness or deformity   HEART:  Regular rate and rhythm, S1 and S2 normal, no murmur, rub, or gallop    ABDOMEN:   Soft, non-tender, bowel sounds active all four quadrants, no masses, no organomegaly, no rebound or guarding   EXTREMITIES: Extremities normal, atraumatic, no cyanosis or edema    SKIN: Dry to touch, no exanthems in the visualized areas   NEURO: Alert, oriented x3, moves all four extremities freely   PSYCH: Cooperative, behavior is appropriate      Data reviewed today: I personally reviewed all new medications, labs, imaging/diagnostics reports over the past 24 hours. Pertinent findings include:    Imaging:   No results found for this or any previous visit (from the past 24 hour(s)).    Labs:  Most Recent 3 BMP's:  Recent Labs   Lab Test 06/18/23  1203 06/18/23  0743 06/17/23 2015 06/16/23  1708 06/16/23  1651   NA  --   --   --   --  137   POTASSIUM  --   --   --   --  4.3   CHLORIDE  --   --   --   --  97*   CO2  --   --   --   --  30   BUN  --   --   --   --  9   CR  --   --   --   --  0.66   ANIONGAP  --   --   --   --  10   PRESTON  --   --   --   --  9.9   * 138* 163*   < > 153*    < > = values in this interval not displayed.       Medications:   Personally Reviewed.  Medications       acetaminophen  975 mg Oral TID     cyanocobalamin  1,000 mcg Sublingual Daily     fexofenadine  180 mg Oral Daily     gabapentin  300 mg Oral BID     hydrOXYzine  10 mg Oral TID     insulin aspart  1-7 Units Subcutaneous TID AC     insulin aspart  1-5 Units Subcutaneous At Bedtime     levothyroxine  125 mcg Oral Daily     lidocaine   Topical 4x Daily     metFORMIN  500 mg Oral BID w/meals     methocarbamol  500 mg Oral 4x Daily     multivitamin, therapeutic  1 tablet Oral Daily     pantoprazole  40 mg Oral Daily     polyethylene glycol  17 g Oral Daily     senna-docusate  1 tablet Oral BID     sitagliptin  100 mg Oral Daily     tolterodine ER  4 mg Oral  Daily     Vitamin D3  50 mcg Oral Daily

## 2023-06-18 NOTE — PROGRESS NOTES
[facility] ACUTE PAIN SERVICE    ([facility], [facility], [facility])  Daily PAIN Progress Note    Assessment/Plan:   Saima Posadas is a 83 year old female who was admitted on 6/14/2023.  Pain team was asked to see the patient for post op spine pain. Admitted for planned surgery, POD 2 BILATERAL LUMBAR 2-3 LAMINECTOMY WITH AUTOGRAFT BILATERAL INSTRUMENTED FUSION LUMBAR 3-4. History of DM2, hypothyroid, neuropathy/chronic pain, copd, GERD, allergies.  Describes thigh pain, mostly left side again, rates at 5/10, somewhat improved, but also notes general soreness in both legs, she reports Tylenol helps with this.  She only describes mild soreness in low back. Patient feels the methocarbamol is helping, and gabapentin increased to bid .  Patient is not sedated or confused. The patient does not smoke and denies chemical dependency history.      Post op day: 4 Days Post-Op. In 24 hours, has utilized 12 mg oral Dilaudid, MME about 48 mg.     Discussed plan with Shreya Song, CNS, CNP, Acute Pain management team.         Opioid Induced Respiratory Depression Risk Assessment:?high: age, post op, BMI 35, concomitant CNS depressants     PLAN:   1) Pain is consistent with postop pain. Treatment plan includes multimodal pain approach, Hospital Medicine Service for medical management, ortho, pt, ot. Patient educated regarding multimodal pain approach, medications as listed below. Patient is understanding of the plan. All questions and concerns addressed to patient's satisfaction.   2)Multimodal Medication Therapy  Topical: : add lidocaine ointment qid   NSAID'S: CrCl 71 ml/min. No-due to post-fusion  iSteroids: none - defer to surgery   Muscle Relaxants: Robaxin 500 mg po qid   Adjuvants: APAP 975mg q8h, increase gabapentin to bid, she was taking it just at bedtime.  Atarax 10mg q6hprn  Opioids: Dilaudid 2-4 mg po q 4 h prn-was changed from oxycodone on 6/16.  IV Pain medication: DC'd   3)Non-medication  interventions: ice, rest, pt, ot  4)Constipation Prophylaxis: Scheduled and prn: Miralax, SennaS     -Opioid prescriber has been none  -MN  pulled from system on 6/16/23. This indicates   Gabapentin only. Last 1/27/23, 300mg #270 for 90 days     Discharge Recommendations - We recommend prescribing the following at the time of discharge: scripts per ortho. Most likely oral Dilaudid, methocarbamol prn x 1 week and increase in gabapentin x 1 week(will not need Rx for this unless goes to TCU)                Lumbar spinal stenosis   Patient Active Problem List   Diagnosis     Status post right knee replacement     Diabetes mellitus type 2 in obese (H)     Lumbar radiculopathy     Acne rosacea     Rotator cuff dysfunction     Lumbar spinal stenosis          Yumiko Khan, [facility]  Acute Care Pain Management Program   Hours of pain coverage 7a-1700- after 1700 please call the house officer    [place] (Facundo PASCUAL, Marian, [place], RH)   Page via [employer]- Click HERE to page Shreya or Ariel text web console

## 2023-06-18 NOTE — PLAN OF CARE
Problem: Pain Acute  Goal: Optimal Pain Control and Function  Intervention: Prevent or Manage Pain  Recent Flowsheet Documentation  Taken 6/17/2023 1630 by Karthik Patel, RN  Medication Review/Management:    high-risk medications identified    medications reviewed   Goal Outcome Evaluation:       Patient alert an oriented. Vss. On Room Air. Saline locked. Requested PRN Milk of magnesium for constipation. Resting on recliner.

## 2023-06-19 VITALS
HEART RATE: 75 BPM | DIASTOLIC BLOOD PRESSURE: 57 MMHG | TEMPERATURE: 97.9 F | OXYGEN SATURATION: 96 % | RESPIRATION RATE: 18 BRPM | SYSTOLIC BLOOD PRESSURE: 115 MMHG | BODY MASS INDEX: 35.54 KG/M2 | HEIGHT: 63 IN | WEIGHT: 200.6 LBS

## 2023-06-19 LAB
GLUCOSE BLDC GLUCOMTR-MCNC: 135 MG/DL (ref 70–99)
GLUCOSE BLDC GLUCOMTR-MCNC: 180 MG/DL (ref 70–99)

## 2023-06-19 PROCEDURE — 250N000013 HC RX MED GY IP 250 OP 250 PS 637: Performed by: EMERGENCY MEDICINE

## 2023-06-19 PROCEDURE — 250N000013 HC RX MED GY IP 250 OP 250 PS 637: Performed by: NURSE PRACTITIONER

## 2023-06-19 PROCEDURE — 250N000013 HC RX MED GY IP 250 OP 250 PS 637: Performed by: PAIN MEDICINE

## 2023-06-19 PROCEDURE — 250N000013 HC RX MED GY IP 250 OP 250 PS 637: Performed by: PHYSICIAN ASSISTANT

## 2023-06-19 RX ORDER — HYDROMORPHONE HYDROCHLORIDE 2 MG/1
2-4 TABLET ORAL EVERY 4 HOURS PRN
Qty: 26 TABLET | Refills: 0 | Status: SHIPPED | OUTPATIENT
Start: 2023-06-19

## 2023-06-19 RX ORDER — CEPHALEXIN 500 MG/1
500 CAPSULE ORAL 4 TIMES DAILY
Qty: 28 CAPSULE | Refills: 0 | Status: SHIPPED | OUTPATIENT
Start: 2023-06-19 | End: 2023-06-26

## 2023-06-19 RX ORDER — METHOCARBAMOL 500 MG/1
500 TABLET, FILM COATED ORAL 4 TIMES DAILY
Qty: 30 TABLET | Refills: 0 | Status: SHIPPED | OUTPATIENT
Start: 2023-06-19

## 2023-06-19 RX ORDER — GABAPENTIN 300 MG/1
300 CAPSULE ORAL 2 TIMES DAILY
Qty: 30 CAPSULE | Refills: 0 | Status: SHIPPED | OUTPATIENT
Start: 2023-06-19

## 2023-06-19 RX ORDER — LIDOCAINE 50 MG/G
OINTMENT TOPICAL 4 TIMES DAILY
Qty: 50 G | Refills: 0 | Status: SHIPPED | OUTPATIENT
Start: 2023-06-19

## 2023-06-19 RX ADMIN — ACETAMINOPHEN 650 MG: 325 TABLET ORAL at 04:22

## 2023-06-19 RX ADMIN — PANTOPRAZOLE SODIUM 40 MG: 40 TABLET, DELAYED RELEASE ORAL at 09:09

## 2023-06-19 RX ADMIN — Medication 1000 MCG: at 09:09

## 2023-06-19 RX ADMIN — THERA TABS 1 TABLET: TAB at 09:08

## 2023-06-19 RX ADMIN — GABAPENTIN 300 MG: 300 CAPSULE ORAL at 09:09

## 2023-06-19 RX ADMIN — METFORMIN ER 500 MG 500 MG: 500 TABLET ORAL at 09:08

## 2023-06-19 RX ADMIN — SENNOSIDES AND DOCUSATE SODIUM 1 TABLET: 50; 8.6 TABLET ORAL at 09:09

## 2023-06-19 RX ADMIN — ACETAMINOPHEN 975 MG: 325 TABLET ORAL at 09:09

## 2023-06-19 RX ADMIN — INSULIN ASPART 1 UNITS: 100 INJECTION, SOLUTION INTRAVENOUS; SUBCUTANEOUS at 12:04

## 2023-06-19 RX ADMIN — HYDROMORPHONE HYDROCHLORIDE 2 MG: 2 TABLET ORAL at 15:49

## 2023-06-19 RX ADMIN — HYDROMORPHONE HYDROCHLORIDE 4 MG: 2 TABLET ORAL at 02:56

## 2023-06-19 RX ADMIN — HYDROXYZINE HYDROCHLORIDE 10 MG: 10 TABLET ORAL at 13:10

## 2023-06-19 RX ADMIN — HYDROXYZINE HYDROCHLORIDE 10 MG: 10 TABLET ORAL at 09:08

## 2023-06-19 RX ADMIN — Medication 50 MCG: at 09:09

## 2023-06-19 RX ADMIN — ACETAMINOPHEN 975 MG: 325 TABLET ORAL at 13:10

## 2023-06-19 RX ADMIN — METHOCARBAMOL TABLETS 500 MG: 500 TABLET, COATED ORAL at 12:07

## 2023-06-19 RX ADMIN — SITAGLIPTIN 100 MG: 100 TABLET, FILM COATED ORAL at 09:09

## 2023-06-19 RX ADMIN — FEXOFENADINE HYDROCHLORIDE 180 MG: 180 TABLET ORAL at 09:09

## 2023-06-19 RX ADMIN — TOLTERODINE 4 MG: 2 CAPSULE, EXTENDED RELEASE ORAL at 09:09

## 2023-06-19 RX ADMIN — METHOCARBAMOL TABLETS 500 MG: 500 TABLET, COATED ORAL at 09:09

## 2023-06-19 RX ADMIN — LEVOTHYROXINE SODIUM 125 MCG: 125 TABLET ORAL at 04:23

## 2023-06-19 ASSESSMENT — ACTIVITIES OF DAILY LIVING (ADL)
ADLS_ACUITY_SCORE: 20
ADLS_ACUITY_SCORE: 23
ADLS_ACUITY_SCORE: 25
ADLS_ACUITY_SCORE: 20
ADLS_ACUITY_SCORE: 25
ADLS_ACUITY_SCORE: 23

## 2023-06-19 NOTE — PROGRESS NOTES
Physical Therapy Discharge Summary    Reason for therapy discharge:    Discharged to transitional care facility.    Progress towards therapy goal(s). See goals on Care Plan in [facility] electronic health record for goal details.  Goals not met.  Barriers to achieving goals:   limited tolerance for therapy.    Therapy recommendation(s):    Continued therapy is recommended.  Rationale/Recommendations:  Patient not at functional mobility baseline.

## 2023-06-19 NOTE — PROGRESS NOTES
Care Management Discharge Note    Discharge Date: 06/19/2023       Discharge Disposition:  [facility] ([facility])    Discharge Services: None    Discharge DME: None    Discharge Transportation: family or friend will provide    PAS Confirmation Code:  Completed by prior CM- PAS [id number]    Education Provided on the Discharge Plan: Yes (AVS per bedside RN)    Persons Notified of Discharge Plans: patient    Patient/Family in Agreement with the Plan: yes    Handoff Referral Completed: Yes    Additional Information:  CM reviewed chart. Referrals sent by previous CM.  CM met with patient. She has been accepted at [facility] ([facility]) and agreeable to this placement. Family will transport to TCU. Awaiting for insurance authorization. Mari in admissions notified CM that insurance authorization has been received by TCU.  CM followed up with patient and family (bedside). All in agreement with [facility] TCU.     CM sent discharge orders. 1:17 PM   THERESE resent orders. 1:34 PM     Gris Alonso RN          .

## 2023-06-19 NOTE — PLAN OF CARE
Problem: Plan of Care - These are the overarching goals to be used throughout the patient stay.    Goal: Absence of Hospital-Acquired Illness or Injury  Intervention: Identify and Manage Fall Risk  Recent Flowsheet Documentation  Taken 6/19/2023 0901 by Sari Crook RN  Safety Promotion/Fall Prevention:    activity supervised    assistive device/personal items within reach    clutter free environment maintained    lighting adjusted    nonskid shoes/slippers when out of bed    safety round/check completed  Taken 6/19/2023 0700 by Sari Crook RN  Safety Promotion/Fall Prevention: safety round/check completed     Patient A&O, VSS, and CMS intact. Pain is managed with medication. Dressing changed today. Patient ready for discharge to TCU. Completed discharge paperwork with patient and family. Patient and family stated understanding and questions were answered. All belongings were sent with the patient. Discharged safely.  Sari Crook RN     coffee

## 2023-06-19 NOTE — DISCHARGE SUMMARY
ORTHOPEDIC DISCHARGE SUMMARY       Saima Posadas,  1940, MRN [id number]    Admission Date: 2023      Admission Diagnoses: Lumbar stenosis [M48.061]  Lumbar radicular pain [M54.16]  Spondylolisthesis of lumbar region [M43.16]  Lumbar spinal stenosis [M48.061]     Discharge Date:  23     Post-operative Day:  5 Days Post-Op    Reason for Admission: The patient was admitted for the following: Procedure(s):  AND BILATERAL LUMBAR 2-3 LAMINECTOMY WITH AUTOGRAFT  BILATERAL INSTRUMENTED FUSION LUMBAR 3-4    BRIEF HOSPITAL COURSE   Saima Posadas is a pleasant 83 year old female who underwent the aforementioned procedure with Dr. Hui  on 23. There were no intraoperative complications and the patient was transferred to the recovery room and later the orthopedic unit in stable condition. Once the patient reached the orthopedic floor our orthopedic pain protocol was implemented along with the following:    Anticoagulation Medications: None  Therapy: PT and OT  Activity: WBAT  Bracing: None    Consultations during Admission: Hospitalist service for medical management     COMPLICATIONS/SIGNIFICANT FINDINGS    None    DISCHARGE INFORMATION   Condition at discharge: Good  Discharge destination: Skilled nursing facility  Patient was seen by myself on the date of discharge.    FOLLOW UP CARE   Follow up with orthopedics in 7-10 days or sooner should the need arise. Ortho will continue to manage pain control, post op anticoagulation and incision care.     Follow up with your PCP for management of chronic medical problems and to evaluate for post op medical complications including constipation, nausea/vomiting, DVT/PE, anemia, changes in blood pressure, fevers/chills, urinary retention and atelectasis/pneumonia.     Subjective   Patient is doing well on POD #1. Pain is well controlled with oral medications. Ambulating. Tolerating oral intake.     Physical Exam   /57 (BP Location: Left arm)   Pulse 75   Temp  "97.9  F (36.6  C) (Oral)   Resp 18   Ht 1.6 m (5' 3\")   Wt 91 kg (200 lb 9.6 oz)   SpO2 96%   BMI 35.53 kg/m    The patient is A&Ox3. Appears comfortable.   Sensation is intact.  Dorsiflexion and plantar flexion is intact.  Dorsalis pedis pulse intact.  Calves are soft and non-tender. Negative Jennifer's.  The incision is covered. Dressing C/D/I    Pertinent Results at Discharge     Hemoglobin   Date/Time Value Ref Range Status   06/16/2023 04:51 PM 11.9 11.7 - 15.7 g/dL Final   01/09/2019 10:34 PM 13.5 12.0 - 16.0 g/dL Final     Platelet Count   Date/Time Value Ref Range Status   06/16/2023 04:51  150 - 450 10e3/uL Final   01/09/2019 10:34  140 - 440 thou/uL Final       Problem List   Principal Problem:    Lumbar spinal stenosis      Nishi Perez PA-C/Dr. Hui  [facility]  [phone]  Date: 6/19/2023  Time: 10:02 AM   "

## 2023-06-19 NOTE — PROGRESS NOTES
Occupational Therapy Discharge Summary    Reason for therapy discharge:    Discharged to transitional care facility.    Progress towards therapy goal(s). See goals on Care Plan in [facility] electronic health record for goal details.  Goals partially met.  Barriers to achieving goals:   discharge from facility.    Therapy recommendation(s):    Continued therapy is recommended.  Rationale/Recommendations:  To increase indep with ADLs and trsfs.

## 2023-06-19 NOTE — PLAN OF CARE
Goal Outcome Evaluation:           Problem: Pain Acute  Goal: Optimal Pain Control and Function  Intervention: Prevent or Manage Pain     Patient alert and oriented x4. VSS on RA. Pt had refused PRN pain medications when offered at 0100 hours. Alternates being in the chair and in bed. While in bed, pt has bed flat and sleeping on her R side. Resting between cares. Pain managed with PRN Dilaudid 4mg and scheduled and PRN Tylenol. Pt wants her medications with pudding or applesauce. Utilized warm packs. Pt stated that there are effective.

## 2023-06-19 NOTE — PROGRESS NOTES
"Orthopedic Progress Note      Assessment: 5 Days Post-Op  S/P Procedure(s):  AND BILATERAL LUMBAR 2-3 LAMINECTOMY WITH AUTOGRAFT  BILATERAL INSTRUMENTED FUSION LUMBAR 3-4 @    Plan:   - Continue PT/OT  - Weightbearing status: WBAT can use brace for comfort  - No bending, twisting or lifting more than 10 pounds for 6 weeks.  - Anticoagulation: no chemical prophylaxis in addition to SCDs, michelle stockings and early ambulation.  - Pain control at discharge: pain team recommends discharge on oral Dilaudid, methocarbamol prn x 1 week and increase in gabapentin x 1 week(will not need Rx for this unless goes to TCU)  - Discharge planning:  TCU today,  to transport      Subjective:  Pain: mild controlled on oral meds  Chest pain, SOB: no  Nausea, Vomiting:  no  Lightheadedness, Dizziness:  no  Neuro:  Patient denies new onset numbness or paresthesias    Patient is doing well today. Pain better controlled. No BM yet but passing gas. No other concerns.    Objective:  /57 (BP Location: Left arm)   Pulse 75   Temp 97.9  F (36.6  C) (Oral)   Resp 18   Ht 1.6 m (5' 3\")   Wt 91 kg (200 lb 9.6 oz)   SpO2 96%   BMI 35.53 kg/m    The patient is A&Ox3. Appears comfortable.   Sensation is intact.  Dorsiflexion and plantar flexion is intact.  Dorsalis pedis pulse intact.  Calves are soft and non-tender. Negative Jennifer's.  The incision is covered. Dressing C/D/I    Pertinent Labs   Lab Results: personally reviewed.   No results found for: INR, PROTIME  Lab Results   Component Value Date    WBC 11.3 (H) 06/16/2023    HGB 11.9 06/16/2023    HCT 36.7 06/16/2023     (H) 06/16/2023     06/16/2023     Lab Results   Component Value Date     06/16/2023    CO2 30 06/16/2023         Report completed by:  Nishi Perez PA-C/[facility]    Date: 6/19/2023  Time: 9:54 AM    "

## 2023-06-19 NOTE — PLAN OF CARE
Problem: Pain Acute  Goal: Optimal Pain Control and Function  Outcome: Progressing  Intervention: Prevent or Manage Pain  Recent Flowsheet Documentation  Taken 6/18/2023 1645 by Karthik Patel RN  Medication Review/Management: high-risk medications identified   Goal Outcome Evaluation:       Patient alert and oriented x4. Vital signs stable. On room air. Saline locked. Alternates being in the chair and in bed. Resting between cares. Pain managed with PRN Dilaudid 4mg and scheduled Tylenol. Pain doing better. Anticipating discharge to TCU tomorrow.

## 2023-06-20 ENCOUNTER — TRANSITIONAL CARE UNIT VISIT (OUTPATIENT)
Dept: GERIATRICS | Facility: CLINIC | Age: 83
End: 2023-06-20
Payer: COMMERCIAL

## 2023-06-20 DIAGNOSIS — M48.00 SPINAL STENOSIS, UNSPECIFIED SPINAL REGION: Primary | ICD-10-CM

## 2023-06-20 DIAGNOSIS — R52 PAIN MANAGEMENT: ICD-10-CM

## 2023-06-20 DIAGNOSIS — E11.9 TYPE 2 DIABETES MELLITUS WITHOUT COMPLICATION, WITHOUT LONG-TERM CURRENT USE OF INSULIN (H): ICD-10-CM

## 2023-06-20 DIAGNOSIS — E03.9 HYPOTHYROIDISM, UNSPECIFIED TYPE: ICD-10-CM

## 2023-06-20 PROCEDURE — 99305 1ST NF CARE MODERATE MDM 35: CPT | Performed by: FAMILY MEDICINE

## 2023-06-20 NOTE — PROGRESS NOTES
06/20/23 3:40 PM    Rosalinda at [facility] ([phone] ext [phone]).  Writer received a voicemail from Roslainda, inquiring about which facility this patient will be discharging to.  The PAS did not relay the facility name to [facility].  Rosalinda welcomes a voicemail on her confidential voicemail line.    Writer called and left a voicemail with Rosalinda's confidential voicemail: [facility] 6/19/23.    Kristen Santamaria RN

## 2023-06-20 NOTE — LETTER
6/20/2023        RE: Saima Posadas  [address]  [place] [address]          [facility]  [facility] Record Number:  [id number]  Place of Service where encounter took place: [facility] ([facility]) [[id number]]   CODE STATUS:   CPR/Full code     Chief Complaint/Reason for Visit:  Chief Complaint   Patient presents with     Hospital F/U     MD note to TCU after back surgery.        HPI:    Saima Posadas is a 83 year old female with hx of DM, hypothyroidism, spinal stenosis, admitted to the hospital on 6/14/2023 for back surgery as noted below.     Admission Date: 6/14/2023    Discharge Date:  06/19/23      Reason for Admission: The patient was admitted for the following: Procedure(s):  AND BILATERAL LUMBAR 2-3 LAMINECTOMY WITH AUTOGRAFT  BILATERAL INSTRUMENTED FUSION LUMBAR 3-4    Saima Posadas is a pleasant 83 year old female who underwent the aforementioned procedure with Dr. Hui  on 6/14/23. There were no intraoperative complications and the patient was transferred to the recovery room and later the orthopedic unit in stable condition. Once the patient reached the orthopedic floor our orthopedic pain protocol was implemented.    Overall stabilized and discharged to TCU on 6/19/2023 for PT, OT, nursing cares, medical management and monitoring.     Today:  She has a lumbar corset to wear. Reports pain is finally better managed, tolerable and manageable. She is hoping to go home very soon. Ambulating with a walker. Lives at home with . Will have follow up with spine surgeon. Denies fever, cough, congestion. No shortness of breath, chest pain or dizziness. Appetite is good. No nausea, vomiting, diarrhea or constipation. No urinary sx. No new vision or hearing concerns. Takes meds for DM, hypothyroidism.       REVIEW OF SYSTEMS:  All others negative other than those noted in HPI.      PAST MEDICAL HISTORY:  Past Medical History:   Diagnosis Date     Chronic obstructive  pulmonary disease, unspecified COPD type (H) 2023     Diabetes (H)      Gastroesophageal reflux disease      Thyroid disease        PAST SURGICAL HISTORY:  Past Surgical History:   Procedure Laterality Date     BREAST CYST INCISION AND DRAINAGE       DECOMPRESSION, FUSION LUMBAR POSTERIOR ONE LEVEL, COMBINED N/A 2023    Procedure: BILATERAL INSTRUMENTED FUSION LUMBAR 3-4;  Surgeon: Poncho Hui MD;  Location: [facility] OR     DILATION AND CURETTAGE       LAMINECTOMY LUMBAR ONE LEVEL Bilateral 2023    Procedure: AND BILATERAL LUMBAR 2-3 LAMINECTOMY WITH AUTOGRAFT;  Surgeon: Poncho Hui MD;  Location: [facility] Main OR     MASTECTOMY Left      RELEASE CARPAL TUNNEL       SEPTOPLASTY       TUBAL LIGATION       UVULECTOMY       ZZC TOTAL KNEE ARTHROPLASTY Right 2017    Procedure:  RIGHT TOTAL KNEE  ARTHROPLASTY;  Surgeon: Daniel Mckinney MD;  Location: [facility];  Service: Orthopedics       FAMILY HISTORY:  Family History   Problem Relation Age of Onset     Ovarian Cancer Mother      Diabetes Mother      Coronary Artery Disease Father      Breast Cancer Sister        SOCIAL HISTORY:  Social History     Socioeconomic History     Marital status:      Spouse name: Not on file     Number of children: Not on file     Years of education: Not on file     Highest education level: Not on file   Occupational History     Not on file   Tobacco Use     Smoking status: Former     Types: Cigarettes     Quit date: 2005     Years since quittin.5     Smokeless tobacco: Not on file   Substance and Sexual Activity     Alcohol use: Yes     Alcohol/week: 1.0 standard drink of alcohol     Comment: Alcoholic Drinks/day: daily     Drug use: No     Sexual activity: Not on file   Other Topics Concern     Not on file   Social History Narrative     Not on file     Social Determinants of Health     Financial Resource Strain: Not on file   Food Insecurity: Not on file   Transportation Needs:  Not on file   Physical Activity: Not on file   Stress: Not on file   Social Connections: Not on file   Intimate Partner Violence: Not on file   Housing Stability: Not on file       MEDICATIONS:  Current Outpatient Medications   Medication Sig Dispense Refill     acetaminophen (TYLENOL) 325 MG tablet Take 3 tablets (975 mg) by mouth every 8 hours 100 tablet 0     cholecalciferol, vitamin D3, (VITAMIN D3) 2,000 unit cap [CHOLECALCIFEROL, VITAMIN D3, (VITAMIN D3) 2,000 UNIT CAP] Take 1 capsule by mouth daily.       cyanocobalamin (VITAMIN B-12) 500 MCG SUBL sublingual tablet Place 500 mcg under the tongue daily       diphenhydrAMINE-acetaminophen (TYLENOL PM EXTRA STRENGTH)  mg Tab [DIPHENHYDRAMINE-ACETAMINOPHEN (TYLENOL PM EXTRA STRENGTH)  MG TAB] Take 1 tablet by mouth bedtime as needed.       fexofenadine (ALLEGRA) 180 MG tablet [FEXOFENADINE (ALLEGRA) 180 MG TABLET] Take 180 mg by mouth daily.       gabapentin (NEURONTIN) 300 MG capsule Take 1 capsule (300 mg) by mouth 2 times daily 30 capsule 0     HYDROmorphone (DILAUDID) 2 MG tablet Take 1-2 tablets (2-4 mg) by mouth every 4 hours as needed for moderate pain or severe pain 26 tablet 0     hydrOXYzine (ATARAX) 10 MG tablet Take 1 tablet (10 mg) by mouth every 6 hours as needed for other (adjuvant pain) 30 tablet 0     ipratropium-albuterol (COMBIVENT RESPIMAT)  MCG/ACT inhaler Inhale 1 puff into the lungs 2 times daily as needed for shortness of breath, wheezing or cough       levothyroxine (SYNTHROID, LEVOTHROID) 125 MCG tablet [LEVOTHYROXINE (SYNTHROID, LEVOTHROID) 125 MCG TABLET] Take 125 mcg by mouth daily.       lidocaine (XYLOCAINE) 5 % external ointment Apply topically 4 times daily 50 g 0     metFORMIN (GLUCOPHAGE-XR) 500 MG 24 hr tablet Take 500 mg by mouth 2 times daily (with meals)       methocarbamol (ROBAXIN) 500 MG tablet Take 1 tablet (500 mg) by mouth 4 times daily 30 tablet 0     omeprazole (PRILOSEC) 20 MG DR capsule Take 20 mg  "by mouth daily       senna-docusate (SENOKOT-S/PERICOLACE) 8.6-50 MG tablet Take 1 tablet by mouth 2 times daily 60 tablet 0     sitagliptin (JANUVIA) 100 MG tablet Take 100 mg by mouth daily       solifenacin (VESICARE) 10 MG tablet Take 10 mg by mouth daily          ALLERGIES:  Allergies   Allergen Reactions     Aspirin Unknown     Nose bleeds     Ipratropium-Albuterol Unknown and Other (See Comments)     na       Other Environmental Allergy Other (See Comments)     Sneezing, itchy, watery eyes       PHYSICAL EXAM:  General: Patient is alert female, no distress.    Vitals: BP (!) 144/54   Pulse 73   Temp 98.4  F (36.9  C)   Resp 18   Ht 1.6 m (5' 3\")   Wt 95.8 kg (211 lb 3.2 oz)   SpO2 97%   BMI 37.41 kg/m    HEENT: Head is NCAT. Eyes show no injection or icterus. Nares negative. Oropharynx well hydrated.  Neck: Supple. No tenderness or adenopathy. No JVD.  Lungs: Clear bilaterally. No wheezes.  Cardiovascular: Regular rate and rhythm, normal S1. S2.  Back: Lumbar corset brace.   Abdomen: Soft, no tenderness on exam. Bowel sounds present. No guarding rebound or rigidity.  : Deferred.  Extremities: No edema is noted.  Musculoskeletal: Degen changes.   Skin: Lumbar incision covered.   Psych: Mood appears good.      LABS/DIAGNOSTIC DATA:    6/14/2023:  Hemoglobin A1C <5.7 % 7.2 High      Comment: Normal <5.7%   Prediabetes 5.7-6.4%     Diabetes 6.5% or higher       Ref Range & Units  6/16/2023     Sodium 136 - 145 mmol/L 137     Potassium 3.5 - 5.0 mmol/L 4.3     Chloride 98 - 107 mmol/L 97 Low      Carbon Dioxide (CO2) 22 - 31 mmol/L 30     Anion Gap 5 - 18 mmol/L 10     Urea Nitrogen 8 - 28 mg/dL 9     Creatinine 0.60 - 1.10 mg/dL 0.66     Calcium 8.5 - 10.5 mg/dL 9.9     Glucose 70 - 125 mg/dL 153 High      GFR Estimate >60 mL/min/1.73m2 88    Comment: eGFR calculated using 2021 CKD-EPI equation.     Component      Latest Ref Rng 6/16/2023  4:51 PM   WBC      4.0 - 11.0 10e3/uL 11.3 (H)    RBC Count      " 3.80 - 5.20 10e6/uL 3.46 (L)    Hemoglobin      11.7 - 15.7 g/dL 11.9    Hematocrit      35.0 - 47.0 % 36.7    MCV      78 - 100 fL 106 (H)    MCH      26.5 - 33.0 pg 34.4 (H)    MCHC      31.5 - 36.5 g/dL 32.4    RDW      10.0 - 15.0 % 12.6    Platelet Count      150 - 450 10e3/uL 164          ASSESSMENT/PLAN:  1. Spinal stenosis. S/p back surgery on 6/14/2023. Pain is manageable. Wears lumbar corset, WBAT, working with therapy. Follow up with spine surgeon.   2. DM. On metformin and sitagliptin as at home. Continue home meds.   3. Hypothyroidism. On replacement levothyroxine.   4. Pain management. She has multiple prn meds with scheduled gabapentin, tylenol. Adequate at present.   5. Code status is full code.         Electronically signed by: Vannessa Rivero MD           Sincerely,        Vannessa Rivero MD

## 2023-06-21 ENCOUNTER — DISCHARGE SUMMARY NURSING HOME (OUTPATIENT)
Dept: GERIATRICS | Facility: CLINIC | Age: 83
End: 2023-06-21
Payer: COMMERCIAL

## 2023-06-21 VITALS
BODY MASS INDEX: 37.42 KG/M2 | WEIGHT: 211.2 LBS | TEMPERATURE: 98.4 F | OXYGEN SATURATION: 97 % | RESPIRATION RATE: 18 BRPM | DIASTOLIC BLOOD PRESSURE: 54 MMHG | HEART RATE: 73 BPM | SYSTOLIC BLOOD PRESSURE: 144 MMHG | HEIGHT: 63 IN

## 2023-06-21 VITALS
RESPIRATION RATE: 18 BRPM | HEIGHT: 63 IN | BODY MASS INDEX: 37.42 KG/M2 | OXYGEN SATURATION: 97 % | TEMPERATURE: 98.4 F | HEART RATE: 73 BPM | SYSTOLIC BLOOD PRESSURE: 144 MMHG | DIASTOLIC BLOOD PRESSURE: 54 MMHG | WEIGHT: 211.2 LBS

## 2023-06-21 DIAGNOSIS — G89.29 CHRONIC BACK PAIN, UNSPECIFIED BACK LOCATION, UNSPECIFIED BACK PAIN LATERALITY: ICD-10-CM

## 2023-06-21 DIAGNOSIS — R53.81 PHYSICAL DECONDITIONING: ICD-10-CM

## 2023-06-21 DIAGNOSIS — K21.00 GASTROESOPHAGEAL REFLUX DISEASE WITH ESOPHAGITIS, UNSPECIFIED WHETHER HEMORRHAGE: ICD-10-CM

## 2023-06-21 DIAGNOSIS — E66.9 DIABETES MELLITUS TYPE 2 IN OBESE: ICD-10-CM

## 2023-06-21 DIAGNOSIS — E03.9 HYPOTHYROIDISM, UNSPECIFIED TYPE: ICD-10-CM

## 2023-06-21 DIAGNOSIS — E11.69 DIABETES MELLITUS TYPE 2 IN OBESE: ICD-10-CM

## 2023-06-21 DIAGNOSIS — I73.9 PVD (PERIPHERAL VASCULAR DISEASE) (H): ICD-10-CM

## 2023-06-21 DIAGNOSIS — M62.81 GENERALIZED MUSCLE WEAKNESS: ICD-10-CM

## 2023-06-21 DIAGNOSIS — M48.061 SPINAL STENOSIS OF LUMBAR REGION, UNSPECIFIED WHETHER NEUROGENIC CLAUDICATION PRESENT: Primary | ICD-10-CM

## 2023-06-21 DIAGNOSIS — M54.9 CHRONIC BACK PAIN, UNSPECIFIED BACK LOCATION, UNSPECIFIED BACK PAIN LATERALITY: ICD-10-CM

## 2023-06-21 DIAGNOSIS — J44.9 CHRONIC OBSTRUCTIVE PULMONARY DISEASE, UNSPECIFIED COPD TYPE (H): ICD-10-CM

## 2023-06-21 PROCEDURE — 99316 NF DSCHRG MGMT 30 MIN+: CPT

## 2023-06-21 NOTE — PROGRESS NOTES
[facility] GERIATRICS DISCHARGE SUMMARY  PATIENT'S NAME: Saima Posadas  YOB: 1940  MEDICAL RECORD NUMBER:  [id number]  Place of Service where encounter took place:  [facility] ([facility]) [[id number]]    PRIMARY CARE PROVIDER AND CLINIC RESPONSIBLE AFTER TRANSFER:   TCU, [facility], [address] [place] [address] Provider     Transferring providers: Ash Martins DNP,APRN,AMADA-BC.   , Vannessa Rivero MD  Recent Hospitalization/ED:  [facility] Hospital stay 6/14/23 to 6/19/23.  Date of SNF Admission: June 19, 2023  Date of [facility] (anticipated) Discharge: June 23, 2023  Discharged to: previous independent home  Cognitive Scores: Alert and oriented  Physical Function: Ambulatory  DME: No new DME needed    CODE STATUS/ADVANCE DIRECTIVES DISCUSSION:  Full code  ALLERGIES: Aspirin, Ipratropium-albuterol, and Other environmental allergy    NURSING FACILITY COURSE   Medication Changes/Rationale:   Summary of nursing facility stay:     Patient admitted to the hospital on 6/14 for elective bilateral laminectomy with fusion at L3/4.  Had uneventful hospital stay.  She was discharged to the above TCU for rehab in stable condition.    Today: Patient is being seen for initial TCU visit.  States she is doing great considering big surgery she had.  Staff at the hospital was very good to her.  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  Ambulating with therapy without any difficulty.  Pain adequately controlled with current regimen.  Hemodynamically stable.  She is discharging this Friday back to her previous independent living environment.  Denies headache, dizziness, chest pain, shortness of breath, abdominal pain or constipation.  Spouse at the bedside.  Staff has no concerns.    Assessment and Plan:  Lumbar spinal stenosis  Back pain  S/p BILATERAL LUMBAR 2-3 LAMINECTOMY WITH AUTOGRAFT BILATERAL INSTRUMENTED FUSION  LUMBAR 3-4  Weakness  Deconditioning  -Pain controlled , incision covered and dressing intact, she will follow-up with Ortho tomorrow, CMS positive  -Continue current pain regimen, Ortho follow-up tomorrow as scheduled, continue therapies, monitor incision, monitor pain    Type 2 diabetes mellitus  PVD  -Stable  -Continue metformin and Januvia, monitor BG, periodic A1c, monitor lower extremity skin integrity    COPD  -Stable respiratory status  -Continue inhalers, monitor respiratory status    Hypothyroidism  -Continue PTA Synthroid, periodic TSH    GERD  -Continue PTA PPI, monitor heartburn    Discharge Medications:  MED REC REQUIRED  Post Medication Reconciliation Status:  Discharge medications reconciled, continue medications without change    Current Outpatient Medications   Medication Sig Dispense Refill     acetaminophen (TYLENOL) 325 MG tablet Take 3 tablets (975 mg) by mouth every 8 hours 100 tablet 0     cephALEXin (KEFLEX) 500 MG capsule Take 1 capsule (500 mg) by mouth 4 times daily for 7 days 28 capsule 0     cholecalciferol, vitamin D3, (VITAMIN D3) 2,000 unit cap [CHOLECALCIFEROL, VITAMIN D3, (VITAMIN D3) 2,000 UNIT CAP] Take 1 capsule by mouth daily.       cyanocobalamin (VITAMIN B-12) 500 MCG SUBL sublingual tablet Place 500 mcg under the tongue daily       diphenhydrAMINE-acetaminophen (TYLENOL PM EXTRA STRENGTH)  mg Tab [DIPHENHYDRAMINE-ACETAMINOPHEN (TYLENOL PM EXTRA STRENGTH)  MG TAB] Take 1 tablet by mouth bedtime as needed.       fexofenadine (ALLEGRA) 180 MG tablet [FEXOFENADINE (ALLEGRA) 180 MG TABLET] Take 180 mg by mouth daily.       gabapentin (NEURONTIN) 300 MG capsule Take 1 capsule (300 mg) by mouth 2 times daily 30 capsule 0     HYDROmorphone (DILAUDID) 2 MG tablet Take 1-2 tablets (2-4 mg) by mouth every 4 hours as needed for moderate pain or severe pain 26 tablet 0     hydrOXYzine (ATARAX) 10 MG tablet Take 1 tablet (10 mg) by mouth every 6 hours as needed for other  "(adjuvant pain) 30 tablet 0     ipratropium-albuterol (COMBIVENT RESPIMAT)  MCG/ACT inhaler Inhale 1 puff into the lungs 2 times daily as needed for shortness of breath, wheezing or cough       levothyroxine (SYNTHROID, LEVOTHROID) 125 MCG tablet [LEVOTHYROXINE (SYNTHROID, LEVOTHROID) 125 MCG TABLET] Take 125 mcg by mouth daily.       lidocaine (XYLOCAINE) 5 % external ointment Apply topically 4 times daily 50 g 0     metFORMIN (GLUCOPHAGE-XR) 500 MG 24 hr tablet Take 500 mg by mouth 2 times daily (with meals)       methocarbamol (ROBAXIN) 500 MG tablet Take 1 tablet (500 mg) by mouth 4 times daily 30 tablet 0     omeprazole (PRILOSEC) 20 MG DR capsule Take 20 mg by mouth daily       senna-docusate (SENOKOT-S/PERICOLACE) 8.6-50 MG tablet Take 1 tablet by mouth 2 times daily 60 tablet 0     sitagliptin (JANUVIA) 100 MG tablet Take 100 mg by mouth daily       solifenacin (VESICARE) 10 MG tablet Take 10 mg by mouth daily        Controlled medications:   As needed Dilaudid     Past Medical History:   Past Medical History:   Diagnosis Date     Diabetes (H)      Gastroesophageal reflux disease      Thyroid disease      Physical Exam:   Vitals: BP (!) 144/54   Pulse 73   Temp 98.4  F (36.9  C)   Resp 18   Ht 1.6 m (5' 3\")   Wt 95.8 kg (211 lb 3.2 oz)   SpO2 97%   BMI 37.41 kg/m    BMI: Body mass index is 37.41 kg/m .     EXAM:  GENERAL APPEARANCE: Well groomed, well-nourished, appropriately dressed, in no acute distress   HEENT-EARS: No discharge/drainage  HEENT-NECK: No lymphadenopathy  HEENT-EYES: PERRLA positive, no drainage/discharge  HEENT-NOSE/MOUTH/THROAT: No nasal drainage or erythema, mucous membranes moist  RESPIRATORY: Clear to auscultation, even and unlabored, symmetrical chest wall expansion  CARDIOVASCULAR: RRR, no peripheral edema, S1/S2 normal  GASTROINTESTINAL: Soft, nontender, nondistended, bowel sounds present x4 quadrants, large   MUSCULOSKELETAL: Back incision covered and dressing " intact  NEUROLOGICAL: Alert and oriented x3  PSYCHOLOGICAL: Pleasant       SNF labs: Labs done in SNF are in [place] EPIC. Please refer to them using EPIC/Care Everywhere.    DISCHARGE PLAN:    Follow up labs: Defer to PCP/Ortho    Medical Follow Up:     Follow up with primary care provider in 1 week  Follow up with Ortho surgery       Discharge Services: Home Care:  Occupational Therapy, Physical Therapy, Registered Nurse and Home Health Aide    Discharge Instructions Verbalized to Patient at Discharge:     Monitor blood glucose 4 times a day. Keep a record and bring it to your next primary provider visit.     Continue to follow your diet:  Carbohydrate Controlled Diet.     Notify your surgeon if you have increased redness, swelling, tenderness, or drainage at your incision site.     DO NOT DRIVE while taking narcotic pain medications.     TOTAL DISCHARGE TIME:   Greater than 30 minutes  Electronically signed by:  Ash Martins,MARIELENA,APRN,AGNP-BC.       Documentation of Face to Face and Certification for Home Health Services    I certify that patient: Samia Posadas is under my care and that I, or a nurse practitioner or physician's assistant working with me, had a face-to-face encounter that meets the physician face-to-face encounter requirements with this patient on: 6/21/23.    This encounter with the patient was in whole, or in part, for the following medical condition, which is the primary reason for home health care:   Lumbar spinal stenosis s/p surgery  Back pain  Weakness  Deconditioning    I certify that, based on my findings, the following services are medically necessary home health services: Nursing, Occupational Therapy, Physical Therapy and HHA.     My clinical findings support the need for the above services because: Nurse is needed to assess labs, changes in medications or other medical regimen; HHA is needed for support with ADLs; Occupational Therapy Services are needed to assess and treat cognitive  ability and address ADL safety due to recent back surgery; Physical Therapy Services are needed to assess and treat the following functional impairments:  Recent back surgery, weakness, and physical deconditioning .      Further, I certify that my clinical findings support that this patient is homebound (i.e. absences from home require considerable and taxing effort and are for medical reasons or [employer] services or infrequently or of short duration when for other reasons) because: Requires assistance of another person or specialized equipment to access medical services because patient: Is unable to exit home safely on own due to:  Recent back surgery, fall risk, weakness, and deconditioning     Based on the above findings, I certify that this patient is confined to the home and needs intermittent skilled nursing care, HHA, physical therapy and Occupational Therapy.  The patient is under my care, and I have initiated the establishment of the plan of care.  This patient will be followed by a physician who will periodically review the plan of care.    The patient is under my care, and I have initiated the establishment of the plan of care.  This patient will be followed by a physician who will periodically review the plan of care.  Physician/Provider to provide follow up care: No primary care provider on file.      Ash Martins,MARIELENA,APRN,AGNP-BC.   Date: 7/2/2023              The above note was completed in part using Dragon voice recognition software. Although reviewed after completion, some word and grammatical errors may occur. Please contact the author of this note with any questions.

## 2023-06-21 NOTE — LETTER
6/21/2023        RE: Saima Posadas  [address]  [place] [address]        [facility] GERIATRICS DISCHARGE SUMMARY  PATIENT'S NAME: Saima Posadas  YOB: 1940  MEDICAL RECORD NUMBER:  [id number]  Place of Service where encounter took place:  [facility] ([facility]) [[id number]]    PRIMARY CARE PROVIDER AND CLINIC RESPONSIBLE AFTER TRANSFER:   TCU, [facility], [address] [place] [id number]    [facility]     Transferring providers: Ash Martins DNP,APRN,BAMP-BC.   , Vannessa Rivero MD  Recent Hospitalization/ED:  [facility] Hospital stay 6/14/23 to 6/19/23.  Date of SNF Admission: June 19, 2023  Date of SNF (anticipated) Discharge: June 23, 2023  Discharged to: previous independent home  Cognitive Scores: Alert and oriented  Physical Function: Ambulatory  DME: No new DME needed    CODE STATUS/ADVANCE DIRECTIVES DISCUSSION:  Full code  ALLERGIES: Aspirin, Ipratropium-albuterol, and Other environmental allergy    NURSING FACILITY COURSE   Medication Changes/Rationale:   Summary of nursing facility stay:     Patient admitted to the hospital on 6/14 for elective bilateral laminectomy with fusion at L3/4.  Had uneventful hospital stay.  She was discharged to the above TCU for rehab in stable condition.    Today: Patient is being seen for initial TCU visit.  States she is doing great considering big surgery she had.  Staff at the hospital was very good to her.  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  Ambulating with therapy without any difficulty.  Pain adequately controlled with current regimen.  Hemodynamically stable.  She is discharging this Friday back to her previous independent living environment.  Denies headache, dizziness, chest pain, shortness of breath, abdominal pain or constipation.  Spouse at the bedside.  Staff has no concerns.    Assessment and Plan:  Lumbar spinal stenosis  Back  pain  S/p BILATERAL LUMBAR 2-3 LAMINECTOMY WITH AUTOGRAFT BILATERAL INSTRUMENTED FUSION LUMBAR 3-4  Weakness  Deconditioning  -Pain controlled , incision covered and dressing intact, she will follow-up with Ortho tomorrow, CMS positive  -Continue current pain regimen, Ortho follow-up tomorrow as scheduled, continue therapies, monitor incision, monitor pain    Type 2 diabetes mellitus  PVD  -Stable  -Continue metformin and Januvia, monitor BG, periodic A1c, monitor lower extremity skin integrity    COPD  -Stable respiratory status  -Continue inhalers, monitor respiratory status    Hypothyroidism  -Continue PTA Synthroid, periodic TSH    GERD  -Continue PTA PPI, monitor heartburn    Discharge Medications:  MED REC REQUIRED  Post Medication Reconciliation Status:  Discharge medications reconciled, continue medications without change    Current Outpatient Medications   Medication Sig Dispense Refill     acetaminophen (TYLENOL) 325 MG tablet Take 3 tablets (975 mg) by mouth every 8 hours 100 tablet 0     cephALEXin (KEFLEX) 500 MG capsule Take 1 capsule (500 mg) by mouth 4 times daily for 7 days 28 capsule 0     cholecalciferol, vitamin D3, (VITAMIN D3) 2,000 unit cap [CHOLECALCIFEROL, VITAMIN D3, (VITAMIN D3) 2,000 UNIT CAP] Take 1 capsule by mouth daily.       cyanocobalamin (VITAMIN B-12) 500 MCG SUBL sublingual tablet Place 500 mcg under the tongue daily       diphenhydrAMINE-acetaminophen (TYLENOL PM EXTRA STRENGTH)  mg Tab [DIPHENHYDRAMINE-ACETAMINOPHEN (TYLENOL PM EXTRA STRENGTH)  MG TAB] Take 1 tablet by mouth bedtime as needed.       fexofenadine (ALLEGRA) 180 MG tablet [FEXOFENADINE (ALLEGRA) 180 MG TABLET] Take 180 mg by mouth daily.       gabapentin (NEURONTIN) 300 MG capsule Take 1 capsule (300 mg) by mouth 2 times daily 30 capsule 0     HYDROmorphone (DILAUDID) 2 MG tablet Take 1-2 tablets (2-4 mg) by mouth every 4 hours as needed for moderate pain or severe pain 26 tablet 0     hydrOXYzine  "(ATARAX) 10 MG tablet Take 1 tablet (10 mg) by mouth every 6 hours as needed for other (adjuvant pain) 30 tablet 0     ipratropium-albuterol (COMBIVENT RESPIMAT)  MCG/ACT inhaler Inhale 1 puff into the lungs 2 times daily as needed for shortness of breath, wheezing or cough       levothyroxine (SYNTHROID, LEVOTHROID) 125 MCG tablet [LEVOTHYROXINE (SYNTHROID, LEVOTHROID) 125 MCG TABLET] Take 125 mcg by mouth daily.       lidocaine (XYLOCAINE) 5 % external ointment Apply topically 4 times daily 50 g 0     metFORMIN (GLUCOPHAGE-XR) 500 MG 24 hr tablet Take 500 mg by mouth 2 times daily (with meals)       methocarbamol (ROBAXIN) 500 MG tablet Take 1 tablet (500 mg) by mouth 4 times daily 30 tablet 0     omeprazole (PRILOSEC) 20 MG DR capsule Take 20 mg by mouth daily       senna-docusate (SENOKOT-S/PERICOLACE) 8.6-50 MG tablet Take 1 tablet by mouth 2 times daily 60 tablet 0     sitagliptin (JANUVIA) 100 MG tablet Take 100 mg by mouth daily       solifenacin (VESICARE) 10 MG tablet Take 10 mg by mouth daily        Controlled medications:   As needed Dilaudid     Past Medical History:   Past Medical History:   Diagnosis Date     Diabetes (H)      Gastroesophageal reflux disease      Thyroid disease      Physical Exam:   Vitals: BP (!) 144/54   Pulse 73   Temp 98.4  F (36.9  C)   Resp 18   Ht 1.6 m (5' 3\")   Wt 95.8 kg (211 lb 3.2 oz)   SpO2 97%   BMI 37.41 kg/m    BMI: Body mass index is 37.41 kg/m .     EXAM:  GENERAL APPEARANCE: Well groomed, well-nourished, appropriately dressed, in no acute distress   HEENT-EARS: No discharge/drainage  HEENT-NECK: No lymphadenopathy  HEENT-EYES: PERRLA positive, no drainage/discharge  HEENT-NOSE/MOUTH/THROAT: No nasal drainage or erythema, mucous membranes moist  RESPIRATORY: Clear to auscultation, even and unlabored, symmetrical chest wall expansion  CARDIOVASCULAR: RRR, no peripheral edema, S1/S2 normal  GASTROINTESTINAL: Soft, nontender, nondistended, bowel sounds " present x4 quadrants, large   MUSCULOSKELETAL: Back incision covered and dressing intact  NEUROLOGICAL: Alert and oriented x3  PSYCHOLOGICAL: Pleasant       SNF labs: Labs done in SNF are in [place] EPIC. Please refer to them using EPIC/Care Everywhere.    DISCHARGE PLAN:    Follow up labs: Defer to PCP/Ortho    Medical Follow Up:     Follow up with primary care provider in 1 week  Follow up with Ortho surgery       Discharge Services: Home Care:  Occupational Therapy, Physical Therapy, Registered Nurse and Home Health Aide    Discharge Instructions Verbalized to Patient at Discharge:     Monitor blood glucose 4 times a day. Keep a record and bring it to your next primary provider visit.     Continue to follow your diet:  Carbohydrate Controlled Diet.     Notify your surgeon if you have increased redness, swelling, tenderness, or drainage at your incision site.     DO NOT DRIVE while taking narcotic pain medications.     TOTAL DISCHARGE TIME:   Greater than 30 minutes  Electronically signed by:  Ash Martins,MARIELENA,APRN,AMADA-BC.       Documentation of Face to Face and Certification for Home Health Services    I certify that patient: Saima Posadas is under my care and that I, or a nurse practitioner or physician's assistant working with me, had a face-to-face encounter that meets the physician face-to-face encounter requirements with this patient on: 6/21/23.    This encounter with the patient was in whole, or in part, for the following medical condition, which is the primary reason for home health care:   Lumbar spinal stenosis s/p surgery  Back pain  Weakness  Deconditioning    I certify that, based on my findings, the following services are medically necessary home health services: Nursing, Occupational Therapy, Physical Therapy and HHA.     My clinical findings support the need for the above services because: Nurse is needed to assess labs, changes in medications or other medical regimen; HHA is needed for support  with ADLs; Occupational Therapy Services are needed to assess and treat cognitive ability and address ADL safety due to recent back surgery; Physical Therapy Services are needed to assess and treat the following functional impairments:  Recent back surgery, weakness, and physical deconditioning .      Further, I certify that my clinical findings support that this patient is homebound (i.e. absences from home require considerable and taxing effort and are for medical reasons or [employer] services or infrequently or of short duration when for other reasons) because: Requires assistance of another person or specialized equipment to access medical services because patient: Is unable to exit home safely on own due to:  Recent back surgery, fall risk, weakness, and deconditioning     Based on the above findings, I certify that this patient is confined to the home and needs intermittent skilled nursing care, HHA, physical therapy and Occupational Therapy.  The patient is under my care, and I have initiated the establishment of the plan of care.  This patient will be followed by a physician who will periodically review the plan of care.    The patient is under my care, and I have initiated the establishment of the plan of care.  This patient will be followed by a physician who will periodically review the plan of care.  Physician/Provider to provide follow up care: No primary care provider on file.      Ash Martins DNP,AVELINA,AGNP-BC.   Date: 7/2/2023              The above note was completed in part using Dragon voice recognition software. Although reviewed after completion, some word and grammatical errors may occur. Please contact the author of this note with any questions.                           Sincerely,        AVELINA Osborne CNP

## 2023-07-02 PROBLEM — J44.9 CHRONIC OBSTRUCTIVE PULMONARY DISEASE, UNSPECIFIED COPD TYPE (H): Status: ACTIVE | Noted: 2023-07-02

## 2023-07-03 NOTE — PROGRESS NOTES
[facility] GERIATRICS  [place] Medical Record Number:  [id number]  Place of Service where encounter took place: [facility] ([facility]) [[id number]]   CODE STATUS:   CPR/Full code     Chief Complaint/Reason for Visit:  Chief Complaint   Patient presents with     Hospital F/U     MD note to TCU after back surgery.        HPI:    Saima Posadas is a 83 year old female with hx of DM, hypothyroidism, spinal stenosis, admitted to the hospital on 6/14/2023 for back surgery as noted below.     Admission Date: 6/14/2023    Discharge Date:  06/19/23      Reason for Admission: The patient was admitted for the following: Procedure(s):  AND BILATERAL LUMBAR 2-3 LAMINECTOMY WITH AUTOGRAFT  BILATERAL INSTRUMENTED FUSION LUMBAR 3-4    Saima Posadas is a pleasant 83 year old female who underwent the aforementioned procedure with Dr. Hui  on 6/14/23. There were no intraoperative complications and the patient was transferred to the recovery room and later the orthopedic unit in stable condition. Once the patient reached the orthopedic floor our orthopedic pain protocol was implemented.    Overall stabilized and discharged to TCU on 6/19/2023 for PT, OT, nursing cares, medical management and monitoring.     Today:  She has a lumbar corset to wear. Reports pain is finally better managed, tolerable and manageable. She is hoping to go home very soon. Ambulating with a walker. Lives at home with . Will have follow up with spine surgeon. Denies fever, cough, congestion. No shortness of breath, chest pain or dizziness. Appetite is good. No nausea, vomiting, diarrhea or constipation. No urinary sx. No new vision or hearing concerns. Takes meds for DM, hypothyroidism.       REVIEW OF SYSTEMS:  All others negative other than those noted in HPI.      PAST MEDICAL HISTORY:  Past Medical History:   Diagnosis Date     Chronic obstructive pulmonary disease, unspecified COPD type (H) 7/2/2023     Diabetes (H)       Gastroesophageal reflux disease      Thyroid disease        PAST SURGICAL HISTORY:  Past Surgical History:   Procedure Laterality Date     BREAST CYST INCISION AND DRAINAGE       DECOMPRESSION, FUSION LUMBAR POSTERIOR ONE LEVEL, COMBINED N/A 2023    Procedure: BILATERAL INSTRUMENTED FUSION LUMBAR 3-4;  Surgeon: Poncho Hui MD;  Location: [facility] Main OR     DILATION AND CURETTAGE       LAMINECTOMY LUMBAR ONE LEVEL Bilateral 2023    Procedure: AND BILATERAL LUMBAR 2-3 LAMINECTOMY WITH AUTOGRAFT;  Surgeon: Poncho Hui MD;  Location: [facility] Main OR     MASTECTOMY Left      RELEASE CARPAL TUNNEL       SEPTOPLASTY       TUBAL LIGATION       UVULECTOMY       ZZC TOTAL KNEE ARTHROPLASTY Right 2017    Procedure:  RIGHT TOTAL KNEE  ARTHROPLASTY;  Surgeon: Daniel Mckinney MD;  Location: [facility];  Service: Orthopedics       FAMILY HISTORY:  Family History   Problem Relation Age of Onset     Ovarian Cancer Mother      Diabetes Mother      Coronary Artery Disease Father      Breast Cancer Sister        SOCIAL HISTORY:  Social History     Socioeconomic History     Marital status:      Spouse name: Not on file     Number of children: Not on file     Years of education: Not on file     Highest education level: Not on file   Occupational History     Not on file   Tobacco Use     Smoking status: Former     Types: Cigarettes     Quit date: 2005     Years since quittin.5     Smokeless tobacco: Not on file   Substance and Sexual Activity     Alcohol use: Yes     Alcohol/week: 1.0 standard drink of alcohol     Comment: Alcoholic Drinks/day: daily     Drug use: No     Sexual activity: Not on file   Other Topics Concern     Not on file   Social History Narrative     Not on file     Social Determinants of Health     Financial Resource Strain: Not on file   Food Insecurity: Not on file   Transportation Needs: Not on file   Physical Activity: Not on file   Stress: Not on file   Social  Connections: Not on file   Intimate Partner Violence: Not on file   Housing Stability: Not on file       MEDICATIONS:  Current Outpatient Medications   Medication Sig Dispense Refill     acetaminophen (TYLENOL) 325 MG tablet Take 3 tablets (975 mg) by mouth every 8 hours 100 tablet 0     cholecalciferol, vitamin D3, (VITAMIN D3) 2,000 unit cap [CHOLECALCIFEROL, VITAMIN D3, (VITAMIN D3) 2,000 UNIT CAP] Take 1 capsule by mouth daily.       cyanocobalamin (VITAMIN B-12) 500 MCG SUBL sublingual tablet Place 500 mcg under the tongue daily       diphenhydrAMINE-acetaminophen (TYLENOL PM EXTRA STRENGTH)  mg Tab [DIPHENHYDRAMINE-ACETAMINOPHEN (TYLENOL PM EXTRA STRENGTH)  MG TAB] Take 1 tablet by mouth bedtime as needed.       fexofenadine (ALLEGRA) 180 MG tablet [FEXOFENADINE (ALLEGRA) 180 MG TABLET] Take 180 mg by mouth daily.       gabapentin (NEURONTIN) 300 MG capsule Take 1 capsule (300 mg) by mouth 2 times daily 30 capsule 0     HYDROmorphone (DILAUDID) 2 MG tablet Take 1-2 tablets (2-4 mg) by mouth every 4 hours as needed for moderate pain or severe pain 26 tablet 0     hydrOXYzine (ATARAX) 10 MG tablet Take 1 tablet (10 mg) by mouth every 6 hours as needed for other (adjuvant pain) 30 tablet 0     ipratropium-albuterol (COMBIVENT RESPIMAT)  MCG/ACT inhaler Inhale 1 puff into the lungs 2 times daily as needed for shortness of breath, wheezing or cough       levothyroxine (SYNTHROID, LEVOTHROID) 125 MCG tablet [LEVOTHYROXINE (SYNTHROID, LEVOTHROID) 125 MCG TABLET] Take 125 mcg by mouth daily.       lidocaine (XYLOCAINE) 5 % external ointment Apply topically 4 times daily 50 g 0     metFORMIN (GLUCOPHAGE-XR) 500 MG 24 hr tablet Take 500 mg by mouth 2 times daily (with meals)       methocarbamol (ROBAXIN) 500 MG tablet Take 1 tablet (500 mg) by mouth 4 times daily 30 tablet 0     omeprazole (PRILOSEC) 20 MG DR capsule Take 20 mg by mouth daily       senna-docusate (SENOKOT-S/PERICOLACE) 8.6-50 MG tablet  "Take 1 tablet by mouth 2 times daily 60 tablet 0     sitagliptin (JANUVIA) 100 MG tablet Take 100 mg by mouth daily       solifenacin (VESICARE) 10 MG tablet Take 10 mg by mouth daily          ALLERGIES:  Allergies   Allergen Reactions     Aspirin Unknown     Nose bleeds     Ipratropium-Albuterol Unknown and Other (See Comments)     na       Other Environmental Allergy Other (See Comments)     Sneezing, itchy, watery eyes       PHYSICAL EXAM:  General: Patient is alert female, no distress.    Vitals: BP (!) 144/54   Pulse 73   Temp 98.4  F (36.9  C)   Resp 18   Ht 1.6 m (5' 3\")   Wt 95.8 kg (211 lb 3.2 oz)   SpO2 97%   BMI 37.41 kg/m    HEENT: Head is NCAT. Eyes show no injection or icterus. Nares negative. Oropharynx well hydrated.  Neck: Supple. No tenderness or adenopathy. No JVD.  Lungs: Clear bilaterally. No wheezes.  Cardiovascular: Regular rate and rhythm, normal S1. S2.  Back: Lumbar corset brace.   Abdomen: Soft, no tenderness on exam. Bowel sounds present. No guarding rebound or rigidity.  : Deferred.  Extremities: No edema is noted.  Musculoskeletal: Degen changes.   Skin: Lumbar incision covered.   Psych: Mood appears good.      LABS/DIAGNOSTIC DATA:    6/14/2023:  Hemoglobin A1C <5.7 % 7.2 High      Comment: Normal <5.7%   Prediabetes 5.7-6.4%     Diabetes 6.5% or higher       Ref Range & Units  6/16/2023     Sodium 136 - 145 mmol/L 137     Potassium 3.5 - 5.0 mmol/L 4.3     Chloride 98 - 107 mmol/L 97 Low      Carbon Dioxide (CO2) 22 - 31 mmol/L 30     Anion Gap 5 - 18 mmol/L 10     Urea Nitrogen 8 - 28 mg/dL 9     Creatinine 0.60 - 1.10 mg/dL 0.66     Calcium 8.5 - 10.5 mg/dL 9.9     Glucose 70 - 125 mg/dL 153 High      GFR Estimate >60 mL/min/1.73m2 88    Comment: eGFR calculated using 2021 CKD-EPI equation.     Component      Latest Ref Rng 6/16/2023  4:51 PM   WBC      4.0 - 11.0 10e3/uL 11.3 (H)    RBC Count      3.80 - 5.20 10e6/uL 3.46 (L)    Hemoglobin      11.7 - 15.7 g/dL 11.9  "   Hematocrit      35.0 - 47.0 % 36.7    MCV      78 - 100 fL 106 (H)    MCH      26.5 - 33.0 pg 34.4 (H)    MCHC      31.5 - 36.5 g/dL 32.4    RDW      10.0 - 15.0 % 12.6    Platelet Count      150 - 450 10e3/uL 164          ASSESSMENT/PLAN:  1. Spinal stenosis. S/p back surgery on 6/14/2023. Pain is manageable. Wears lumbar corset, WBAT, working with therapy. Follow up with spine surgeon.   2. DM. On metformin and sitagliptin as at home. Continue home meds.   3. Hypothyroidism. On replacement levothyroxine.   4. Pain management. She has multiple prn meds with scheduled gabapentin, tylenol. Adequate at present.   5. Code status is full code.         Electronically signed by: Vannessa Rivero MD

## (undated) DEVICE — RX SURGIFLO HEMOSTATIC MATRIX 8ML 2991

## (undated) DEVICE — CELL SAVER

## (undated) DEVICE — PLATE GROUNDING ADULT W/CORD 9165L

## (undated) DEVICE — ESU ELEC BLADE 2.75" COATED/INSULATED E1455

## (undated) DEVICE — DRAPE STERI TOWEL LG 1010

## (undated) DEVICE — ESU PENCIL SMOKE EVAC W/ROCKER SWITCH 0703-047-000

## (undated) DEVICE — DRAPE SHEET REV FOLD 3/4 9349

## (undated) DEVICE — WRAP LUMBAR COMPRESS COLD THERAPY 4632P

## (undated) DEVICE — SOL WATER IRRIG 1000ML BOTTLE 2F7114

## (undated) DEVICE — CATH TRAY FOLEY SURESTEP 16FR DRAIN BAG STATOCK A899916

## (undated) DEVICE — KIT DRAIN CLOSED WOUND SUCTION MED 400ML RESVR

## (undated) DEVICE — DRAPE BACK TABLE PADDED 60X90

## (undated) DEVICE — Device

## (undated) DEVICE — SU STRATAFIX MONOCRYL 3-0 SPIRAL PS-2 30CM SXMP1B106

## (undated) DEVICE — GLOVE SURG PI ULTRA TOUCH M SZ 8-1/2 LF

## (undated) DEVICE — SUCTION MANIFOLD NEPTUNE 2 SYS 1 PORT 702-025-000

## (undated) DEVICE — SYR 05ML LL W/O NDL

## (undated) DEVICE — PACK COLD 6 X 10 1-HOUR 0814-0610

## (undated) DEVICE — SOL NACL 0.9% IRRIG 1000ML BOTTLE 2F7124

## (undated) DEVICE — GLOVE BIOGEL PI INDICATOR 9.0 LF  41690

## (undated) DEVICE — CATH IV ANGIO INTRO 14GA X 1 3/4" 381467

## (undated) DEVICE — GLOVE UNDER INDICATOR PI SZ 8.5 LF 41685

## (undated) DEVICE — CUSTOM PACK LUMBAR FUSION SNE5BLFHEA

## (undated) DEVICE — PACK MINOR SINGLE BASIN SSK3001

## (undated) DEVICE — SU STRATAFIX PDS PLUS 1 CT-1 18" SXPP1A404

## (undated) DEVICE — GOWN IMPERVIOUS BREATHABLE 2XL/XLONG

## (undated) DEVICE — DRAPE C-ARM 60X42" 1013

## (undated) DEVICE — DRSG PRIMAPORE 03 1/8X6" 66000318

## (undated) DEVICE — SUTURE VICRYL+ 2-0 CT-2 27" UND VCP269H

## (undated) DEVICE — SYR 03ML LL W/O NDL 309657

## (undated) DEVICE — CUSHION INSERT LG PRONE VIEW JACKSON TABLE

## (undated) DEVICE — TOOL DISSECT MIDAS MR8 14CM MATCH HEAD 3MM MR8-14MH30

## (undated) DEVICE — SYR BULB IRRIG DOVER 60 ML LATEX FREE 67000

## (undated) DEVICE — GLOVE BIOGEL PI ORTHOPRO SZ 8.5 47685

## (undated) DEVICE — POSITIONER ARM CRADLE LAMINECTOMY DISP

## (undated) RX ORDER — FENTANYL CITRATE 50 UG/ML
INJECTION, SOLUTION INTRAMUSCULAR; INTRAVENOUS
Status: DISPENSED
Start: 2023-06-14

## (undated) RX ORDER — DEXAMETHASONE SODIUM PHOSPHATE 10 MG/ML
INJECTION, EMULSION INTRAMUSCULAR; INTRAVENOUS
Status: DISPENSED
Start: 2023-06-14

## (undated) RX ORDER — ONDANSETRON 2 MG/ML
INJECTION INTRAMUSCULAR; INTRAVENOUS
Status: DISPENSED
Start: 2023-06-14

## (undated) RX ORDER — PROPOFOL 10 MG/ML
INJECTION, EMULSION INTRAVENOUS
Status: DISPENSED
Start: 2023-06-14

## (undated) RX ORDER — GLYCOPYRROLATE 0.2 MG/ML
INJECTION, SOLUTION INTRAMUSCULAR; INTRAVENOUS
Status: DISPENSED
Start: 2023-06-14

## (undated) RX ORDER — LIDOCAINE HYDROCHLORIDE 10 MG/ML
INJECTION, SOLUTION EPIDURAL; INFILTRATION; INTRACAUDAL; PERINEURAL
Status: DISPENSED
Start: 2023-06-14